# Patient Record
Sex: FEMALE | Race: WHITE | Employment: OTHER | URBAN - METROPOLITAN AREA
[De-identification: names, ages, dates, MRNs, and addresses within clinical notes are randomized per-mention and may not be internally consistent; named-entity substitution may affect disease eponyms.]

---

## 2016-12-29 LAB
BILIRUBIN, URINE: NORMAL
BLOOD, URINE: NORMAL
CHOLESTEROL, TOTAL: 153 MG/DL
CHOLESTEROL/HDL RATIO: 2.3
CLARITY: NORMAL
COLOR: NORMAL
GLUCOSE URINE: NORMAL
HBA1C MFR BLD: 5.6 %
HDLC SERPL-MCNC: 66 MG/DL (ref 35–70)
KETONES, URINE: NORMAL
LDL CHOLESTEROL CALCULATED: 75 MG/DL (ref 0–160)
LEUKOCYTE ESTERASE, URINE: NORMAL
NITRITE, URINE: NORMAL
PH UA: NORMAL (ref 4.5–8)
PROTEIN UA: NORMAL
SPECIFIC GRAVITY, URINE: NORMAL
T4 FREE: 0.89
TRIGL SERPL-MCNC: 58 MG/DL
TSH SERPL DL<=0.05 MIU/L-ACNC: 5.08 UIU/ML
UROBILINOGEN, URINE: NORMAL
VLDLC SERPL CALC-MCNC: 12 MG/DL

## 2017-01-05 DIAGNOSIS — R35.0 FREQUENCY OF URINATION: ICD-10-CM

## 2017-01-05 DIAGNOSIS — E78.00 HIGH CHOLESTEROL: ICD-10-CM

## 2017-01-05 DIAGNOSIS — R79.89 ELEVATED TSH: ICD-10-CM

## 2017-01-05 DIAGNOSIS — Z23 NEED FOR INFLUENZA VACCINATION: ICD-10-CM

## 2017-01-05 DIAGNOSIS — R73.9 HYPERGLYCEMIA: ICD-10-CM

## 2017-01-05 DIAGNOSIS — I10 ESSENTIAL HYPERTENSION: ICD-10-CM

## 2017-04-13 ENCOUNTER — OFFICE VISIT (OUTPATIENT)
Dept: INTERNAL MEDICINE CLINIC | Age: 82
End: 2017-04-13
Payer: COMMERCIAL

## 2017-04-13 VITALS
RESPIRATION RATE: 24 BRPM | SYSTOLIC BLOOD PRESSURE: 140 MMHG | TEMPERATURE: 98.3 F | DIASTOLIC BLOOD PRESSURE: 88 MMHG | BODY MASS INDEX: 35.57 KG/M2 | HEART RATE: 80 BPM | WEIGHT: 188.4 LBS

## 2017-04-13 DIAGNOSIS — R06.02 SHORTNESS OF BREATH ON EXERTION: Primary | ICD-10-CM

## 2017-04-13 DIAGNOSIS — R79.89 ELEVATED TSH: ICD-10-CM

## 2017-04-13 DIAGNOSIS — M25.50 ARTHRALGIA, UNSPECIFIED JOINT: ICD-10-CM

## 2017-04-13 PROCEDURE — 99214 OFFICE O/P EST MOD 30 MIN: CPT | Performed by: INTERNAL MEDICINE

## 2017-04-13 PROCEDURE — 93000 ELECTROCARDIOGRAM COMPLETE: CPT | Performed by: INTERNAL MEDICINE

## 2017-04-13 RX ORDER — LEVOTHYROXINE SODIUM 0.03 MG/1
25 TABLET ORAL DAILY
Qty: 30 TABLET | Refills: 5 | Status: SHIPPED | OUTPATIENT
Start: 2017-04-13 | End: 2017-12-10 | Stop reason: SDUPTHER

## 2017-04-21 LAB
ANA TITER: NEGATIVE
BASOPHILS ABSOLUTE: NORMAL /ΜL
BASOPHILS RELATIVE PERCENT: NORMAL %
EOSINOPHILS ABSOLUTE: NORMAL /ΜL
EOSINOPHILS RELATIVE PERCENT: NORMAL %
HCT VFR BLD CALC: 40.6 % (ref 36–46)
HEMOGLOBIN: 13.6 G/DL (ref 12–16)
LYMPHOCYTES ABSOLUTE: NORMAL /ΜL
LYMPHOCYTES RELATIVE PERCENT: NORMAL %
MCH RBC QN AUTO: NORMAL PG
MCHC RBC AUTO-ENTMCNC: NORMAL G/DL
MCV RBC AUTO: NORMAL FL
MONOCYTES ABSOLUTE: NORMAL /ΜL
MONOCYTES RELATIVE PERCENT: NORMAL %
NEUTROPHILS ABSOLUTE: NORMAL /ΜL
NEUTROPHILS RELATIVE PERCENT: NORMAL %
PLATELET # BLD: 224 K/ΜL
PMV BLD AUTO: NORMAL FL
RBC # BLD: 13.6 10^6/ΜL
RHEUMATOID FACTOR: <10
TOTAL CK: 80 U/L
WBC # BLD: 7.1 10^3/ML

## 2017-05-11 ENCOUNTER — OFFICE VISIT (OUTPATIENT)
Dept: INTERNAL MEDICINE CLINIC | Age: 82
End: 2017-05-11
Payer: COMMERCIAL

## 2017-05-11 VITALS
TEMPERATURE: 98.7 F | HEIGHT: 61 IN | DIASTOLIC BLOOD PRESSURE: 78 MMHG | OXYGEN SATURATION: 96 % | SYSTOLIC BLOOD PRESSURE: 144 MMHG | WEIGHT: 188 LBS | HEART RATE: 75 BPM | BODY MASS INDEX: 35.5 KG/M2 | RESPIRATION RATE: 16 BRPM

## 2017-05-11 DIAGNOSIS — I10 ESSENTIAL HYPERTENSION: Primary | ICD-10-CM

## 2017-05-11 DIAGNOSIS — E03.9 HYPOTHYROIDISM, UNSPECIFIED TYPE: ICD-10-CM

## 2017-05-11 PROCEDURE — 99213 OFFICE O/P EST LOW 20 MIN: CPT | Performed by: INTERNAL MEDICINE

## 2017-05-11 RX ORDER — AMLODIPINE BESYLATE 5 MG/1
TABLET ORAL
Refills: 0 | COMMUNITY
Start: 2017-04-17 | End: 2018-09-25 | Stop reason: CLARIF

## 2017-05-16 DIAGNOSIS — M25.50 ARTHRALGIA, UNSPECIFIED JOINT: ICD-10-CM

## 2017-05-16 DIAGNOSIS — R06.02 SHORTNESS OF BREATH ON EXERTION: ICD-10-CM

## 2017-05-18 DIAGNOSIS — M25.50 ARTHRALGIA, UNSPECIFIED JOINT: ICD-10-CM

## 2017-05-18 DIAGNOSIS — R06.02 SHORTNESS OF BREATH ON EXERTION: ICD-10-CM

## 2017-07-02 DIAGNOSIS — I10 ESSENTIAL HYPERTENSION: ICD-10-CM

## 2017-07-02 DIAGNOSIS — R79.89 ELEVATED TSH: ICD-10-CM

## 2017-07-02 DIAGNOSIS — R31.9 HEMATURIA: ICD-10-CM

## 2017-07-02 DIAGNOSIS — E87.6 HYPOKALEMIA: ICD-10-CM

## 2017-07-03 LAB
T4 FREE: 0.73
TSH SERPL DL<=0.05 MIU/L-ACNC: 4.04 UIU/ML

## 2017-07-03 RX ORDER — LISINOPRIL AND HYDROCHLOROTHIAZIDE 25; 20 MG/1; MG/1
TABLET ORAL
Qty: 90 TABLET | Refills: 1 | Status: SHIPPED | OUTPATIENT
Start: 2017-07-03 | End: 2018-01-03 | Stop reason: SDUPTHER

## 2017-07-06 DIAGNOSIS — I10 ESSENTIAL HYPERTENSION: ICD-10-CM

## 2017-07-06 DIAGNOSIS — E03.9 HYPOTHYROIDISM, UNSPECIFIED TYPE: ICD-10-CM

## 2017-07-13 ENCOUNTER — OFFICE VISIT (OUTPATIENT)
Dept: INTERNAL MEDICINE CLINIC | Age: 82
End: 2017-07-13
Payer: COMMERCIAL

## 2017-07-13 VITALS
DIASTOLIC BLOOD PRESSURE: 80 MMHG | WEIGHT: 186 LBS | HEIGHT: 61 IN | SYSTOLIC BLOOD PRESSURE: 112 MMHG | RESPIRATION RATE: 20 BRPM | HEART RATE: 80 BPM | BODY MASS INDEX: 35.12 KG/M2 | TEMPERATURE: 98.1 F

## 2017-07-13 DIAGNOSIS — I10 ESSENTIAL HYPERTENSION: Primary | ICD-10-CM

## 2017-07-13 DIAGNOSIS — E03.9 HYPOTHYROIDISM, UNSPECIFIED TYPE: ICD-10-CM

## 2017-07-13 DIAGNOSIS — F32.A DEPRESSION, UNSPECIFIED DEPRESSION TYPE: ICD-10-CM

## 2017-07-13 PROCEDURE — 99214 OFFICE O/P EST MOD 30 MIN: CPT | Performed by: INTERNAL MEDICINE

## 2017-07-13 RX ORDER — ESCITALOPRAM OXALATE 10 MG/1
10 TABLET ORAL DAILY
Qty: 30 TABLET | Refills: 2 | Status: SHIPPED | OUTPATIENT
Start: 2017-07-13 | End: 2018-07-17 | Stop reason: SDUPTHER

## 2017-10-18 ENCOUNTER — OFFICE VISIT (OUTPATIENT)
Dept: INTERNAL MEDICINE CLINIC | Age: 82
End: 2017-10-18
Payer: COMMERCIAL

## 2017-10-18 VITALS
HEART RATE: 64 BPM | RESPIRATION RATE: 16 BRPM | WEIGHT: 185 LBS | DIASTOLIC BLOOD PRESSURE: 80 MMHG | SYSTOLIC BLOOD PRESSURE: 122 MMHG | HEIGHT: 61 IN | TEMPERATURE: 98.4 F | BODY MASS INDEX: 34.93 KG/M2

## 2017-10-18 DIAGNOSIS — M76.61 ACHILLES TENDINITIS OF RIGHT LOWER EXTREMITY: Primary | ICD-10-CM

## 2017-10-18 DIAGNOSIS — E03.9 HYPOTHYROIDISM, UNSPECIFIED TYPE: ICD-10-CM

## 2017-10-18 DIAGNOSIS — I10 ESSENTIAL HYPERTENSION: ICD-10-CM

## 2017-10-18 PROCEDURE — 99214 OFFICE O/P EST MOD 30 MIN: CPT | Performed by: INTERNAL MEDICINE

## 2017-10-18 RX ORDER — PREDNISONE 10 MG/1
10 TABLET ORAL
Qty: 15 TABLET | Refills: 0 | Status: SHIPPED | OUTPATIENT
Start: 2017-10-18 | End: 2017-10-23

## 2017-10-18 NOTE — PROGRESS NOTES
Corinne Ham is a 80 y.o. female who presents for   Chief Complaint   Patient presents with    Foot Pain     c/o right heel pain and feels hot at times. started about a week ago. has soaked with water and salt, heat and cold compresses, tried advil prn.    and follow up of chronic medical problems. Patient Active Problem List   Diagnosis    HBP (high blood pressure)     HPI  Here for evaluation of right heel pain started after wearing high heels and went to the shopping and then patient's symptoms started and now feeling pain when she was walking mostly in the right foot and also follow-up on blood pressure and thyroid    Current Outpatient Prescriptions   Medication Sig Dispense Refill    predniSONE (DELTASONE) 10 MG tablet Take 1 tablet by mouth 3 times daily (with meals) for 5 days 15 tablet 0    escitalopram (LEXAPRO) 10 MG tablet Take 1 tablet by mouth daily 30 tablet 2    lisinopril-hydrochlorothiazide (PRINZIDE;ZESTORETIC) 20-25 MG per tablet take 1 tablet by mouth once daily 90 tablet 1    amLODIPine (NORVASC) 5 MG tablet take 1 tablet by mouth once daily  0    levothyroxine (LEVOTHROID) 25 MCG tablet Take 1 tablet by mouth daily 30 tablet 5    mometasone-formoterol (DULERA) 100-5 MCG/ACT inhaler Inhale 2 puffs into the lungs 2 times daily 1 Inhaler 0    Zinc 22.5 MG TABS Take by mouth daily      fluticasone (FLONASE) 50 MCG/ACT nasal spray 2 sprays by Nasal route daily (Patient taking differently: 2 sprays by Nasal route daily as needed ) 1 Bottle 0    BIOTIN by Does not apply route.  CALCIUM PO Take  by mouth daily.  Cholecalciferol (VITAMIN D PO) Take  by mouth daily.  aspirin 81 MG tablet Take 81 mg by mouth daily. No current facility-administered medications for this visit.         Allergies   Allergen Reactions    Codeine        Past Medical History:   Diagnosis Date    HBP (high blood pressure)     Hematuria 4-21-16    Patient had a complete workup in the past distal pulses  Pulmonary:  effort normal and breath sounds normal bilaterally,no wheezes or rales, no respiratory distress  Abdominal:  Soft, non-tender; normal bowel sounds, no masses  Musculoskeletal:  Normal range of motion and no edema or tenderness bilaterally and there is tenderness on the right Achilles tendon and movements were limited because of the pain  No lymphadenopathy  Neurological:  alert, oriented, and normal reflexes bilaterally  Skin: warm and dry  Psychiatric:  normal mood and effect; behavior normal.    Labs:   Lab Results   Component Value Date    LABA1C 5.6 12/29/2016     Lab Results   Component Value Date    CHOL 153 12/29/2016     Lab Results   Component Value Date    HDL 66 12/29/2016     Lab Results   Component Value Date    LDLCALC 75 12/29/2016     Lab Results   Component Value Date    TRIG 58 12/29/2016     No components found for: [place], [place]  Lab Results   Component Value Date    WBC 7.1 04/21/2017    HGB 13.6 04/21/2017    HCT 40.6 04/21/2017     04/21/2017     No results found for: INR, PROTIME  Lab Results   Component Value Date    GLUCOSE 107 01/22/2014    CREATININE 0.95 01/22/2014    BUN 17 01/22/2014     01/22/2014    K 3.6 01/22/2014     01/22/2014    CO2 27 01/22/2014     Lab Results   Component Value Date    ALT 16 01/22/2014    AST 23 01/22/2014    ALKPHOS 72 01/22/2014    BILITOT 1.0 01/22/2014     Lab Results   Component Value Date    LABALBU 3.7 01/22/2014     Lab Results   Component Value Date    TSH 4.04 07/03/2017     Assessment:  1. Achilles tendinitis of right lower extremity     2. Essential hypertension     3.  Hypothyroidism, unspecified type           Plan:  Advised patient to use prednisone 10 mg 3 times a day for 5 days apply warm compresses and not to use high heels  Patient also advised to see the practice to and patient wants to wait at this time and will call me back in the next week if no improvement will refer to podiatry  Patient's blood pressure stable on current medications  Continue Synthroid and last TSH was 4.04  Review in 4 months           1. Shasta received counseling on the following healthy behaviors: nutrition and exercise    2. Prior labs and health maintenance reviewed. 3.  Discussed use, benefit, and side effects of prescribed medications. Barriers to medication compliance addressed. All her questions were answered. Pt voiced understanding. Chaz Jones will continue current medications, diet and exercise. Orders Placed This Encounter   Medications    predniSONE (DELTASONE) 10 MG tablet     Sig: Take 1 tablet by mouth 3 times daily (with meals) for 5 days     Dispense:  15 tablet     Refill:  0          Completed Refills               Requested Prescriptions     Signed Prescriptions Disp Refills    predniSONE (DELTASONE) 10 MG tablet 15 tablet 0     Sig: Take 1 tablet by mouth 3 times daily (with meals) for 5 days     4. Patient given educational materials - see patient instructions    5. Was a self-tracking handout given in paper form or via [employer]? NO    No orders of the defined types were placed in this encounter. No Follow-up on file. Patient voiced understanding and agreed to treatment plan. Electronically signed by Wilver James MD on 10/18/2017 at 1:34 PM    This note is created with a voice recognition program and while intend to generate a document that accurately reflects the content of the visit, no guarantee can be provided that every mistake has been identified and corrected by editing.

## 2017-10-18 NOTE — PROGRESS NOTES
Chronic Disease Visit Information    BP Readings from Last 3 Encounters:   07/13/17 112/80   05/11/17 (!) 144/78   04/13/17 (!) 140/88          Hemoglobin A1C (%)   Date Value   12/29/2016 5.6   04/08/2016 6.0   11/26/2014 5.9     LDL Calculated (mg/dL)   Date Value   12/29/2016 75     HDL (mg/dL)   Date Value   12/29/2016 66     BUN (mg/dL)   Date Value   01/22/2014 17     CREATININE (no units)   Date Value   01/22/2014 0.95     Glucose (mg/dL)   Date Value   01/22/2014 107            Have you changed or started any medications since your last visit including any over-the-counter medicines, vitamins, or herbal medicines? no   Are you having any side effects from any of your medications? -  no  Have you stopped taking any of your medications? Is so, why? -  no    Have you seen any other physician or provider since your last visit? No  Have you had any other diagnostic tests since your last visit? No  Have you been seen in the emergency room and/or had an admission to a hospital since we last saw you? No  Have you had your annual diabetic retinal (eye) exam? No  Have you had your routine dental cleaning in the past 6 months? no    Have you activated your [employer] account? If not, what are your barriers?  Yes     Patient Care Team:  Naomi Avina MD as PCP - General (Internal Medicine)         Medical History Review  Past Medical, Family, and Social History reviewed and does contribute to the patient presenting condition    Health Maintenance   Topic Date Due    Flu vaccine (1) 09/01/2017    Zostavax vaccine  12/12/2017 (Originally 2/10/1995)    DTaP/Tdap/Td vaccine (1 - Tdap) 05/15/2018 (Originally 2/10/1954)    DEXA (modify frequency per FRAX score)  Addressed    Pneumococcal low/med risk  Completed

## 2017-11-15 ENCOUNTER — OFFICE VISIT (OUTPATIENT)
Dept: INTERNAL MEDICINE CLINIC | Age: 82
End: 2017-11-15
Payer: COMMERCIAL

## 2017-11-15 VITALS
RESPIRATION RATE: 16 BRPM | WEIGHT: 189 LBS | HEIGHT: 61 IN | SYSTOLIC BLOOD PRESSURE: 156 MMHG | HEART RATE: 67 BPM | BODY MASS INDEX: 35.68 KG/M2 | OXYGEN SATURATION: 97 % | DIASTOLIC BLOOD PRESSURE: 90 MMHG

## 2017-11-15 DIAGNOSIS — R73.9 HYPERGLYCEMIA: ICD-10-CM

## 2017-11-15 DIAGNOSIS — E03.9 HYPOTHYROIDISM, UNSPECIFIED TYPE: ICD-10-CM

## 2017-11-15 DIAGNOSIS — E78.00 HIGH CHOLESTEROL: ICD-10-CM

## 2017-11-15 DIAGNOSIS — I10 ESSENTIAL HYPERTENSION: Primary | ICD-10-CM

## 2017-11-15 PROBLEM — E66.8 CONSTITUTIONAL OBESITY: Status: ACTIVE | Noted: 2017-10-23

## 2017-11-15 PROBLEM — R06.02 SHORTNESS OF BREATH: Status: ACTIVE | Noted: 2017-10-23

## 2017-11-15 PROCEDURE — G0008 ADMIN INFLUENZA VIRUS VAC: HCPCS | Performed by: INTERNAL MEDICINE

## 2017-11-15 PROCEDURE — 99214 OFFICE O/P EST MOD 30 MIN: CPT | Performed by: INTERNAL MEDICINE

## 2017-11-15 PROCEDURE — 90688 IIV4 VACCINE SPLT 0.5 ML IM: CPT | Performed by: INTERNAL MEDICINE

## 2017-11-15 NOTE — PROGRESS NOTES
Katelyn Vázquez is a 80 y.o. female who presents for   Chief Complaint   Patient presents with    Hypertension     4 month check up; patient states that sometimes she forgets to take her BP medication but she made sure she took today. patient was unable to review medication list \"states doesnt sound fimilar\"    Headache     Patient thinks it has to do with a weather     Health Maintenance     patient due for influenza vacc    and follow up of chronic medical problems. Patient Active Problem List   Diagnosis    HBP (high blood pressure)    Constitutional obesity    Hypothyroidism    Shortness of breath     HPI  Here for follow-up on blood pressure denies any new complaints    Current Outpatient Prescriptions   Medication Sig Dispense Refill    escitalopram (LEXAPRO) 10 MG tablet Take 1 tablet by mouth daily 30 tablet 2    lisinopril-hydrochlorothiazide (PRINZIDE;ZESTORETIC) 20-25 MG per tablet take 1 tablet by mouth once daily 90 tablet 1    levothyroxine (LEVOTHROID) 25 MCG tablet Take 1 tablet by mouth daily 30 tablet 5    Zinc 22.5 MG TABS Take by mouth daily      fluticasone (FLONASE) 50 MCG/ACT nasal spray 2 sprays by Nasal route daily (Patient taking differently: 2 sprays by Nasal route daily as needed ) 1 Bottle 0    BIOTIN by Does not apply route.  CALCIUM PO Take  by mouth daily.  Cholecalciferol (VITAMIN D PO) Take  by mouth daily.  aspirin 81 MG tablet Take 81 mg by mouth daily.  amLODIPine (NORVASC) 5 MG tablet take 1 tablet by mouth once daily  0     No current facility-administered medications for this visit. Allergies   Allergen Reactions    Codeine        Past Medical History:   Diagnosis Date    HBP (high blood pressure)     Hematuria 4-21-16    Patient had a complete workup in the past    Hyperglycemia        No past surgical history on file.     Family History   Problem Relation Age of Onset    High Blood Pressure Mother     High Blood Pressure

## 2017-12-11 RX ORDER — LEVOTHYROXINE SODIUM 0.03 MG/1
TABLET ORAL
Qty: 30 TABLET | Refills: 3 | Status: SHIPPED | OUTPATIENT
Start: 2017-12-11 | End: 2018-06-20 | Stop reason: SDUPTHER

## 2018-01-03 DIAGNOSIS — I10 ESSENTIAL HYPERTENSION: ICD-10-CM

## 2018-01-03 DIAGNOSIS — E87.6 HYPOKALEMIA: ICD-10-CM

## 2018-01-03 DIAGNOSIS — R31.9 HEMATURIA: ICD-10-CM

## 2018-01-03 DIAGNOSIS — R79.89 ELEVATED TSH: ICD-10-CM

## 2018-01-04 RX ORDER — LISINOPRIL AND HYDROCHLOROTHIAZIDE 25; 20 MG/1; MG/1
TABLET ORAL
Qty: 90 TABLET | Refills: 1 | Status: SHIPPED | OUTPATIENT
Start: 2018-01-04 | End: 2019-03-27 | Stop reason: SDUPTHER

## 2018-03-06 RX ORDER — OXYBUTYNIN CHLORIDE 10 MG/1
TABLET, EXTENDED RELEASE ORAL
Qty: 30 TABLET | Refills: 1 | Status: SHIPPED | OUTPATIENT
Start: 2018-03-06 | End: 2018-04-30 | Stop reason: SDUPTHER

## 2018-03-16 ENCOUNTER — OFFICE VISIT (OUTPATIENT)
Dept: INTERNAL MEDICINE CLINIC | Age: 83
End: 2018-03-16
Payer: COMMERCIAL

## 2018-03-16 VITALS
WEIGHT: 187.6 LBS | BODY MASS INDEX: 35.42 KG/M2 | OXYGEN SATURATION: 97 % | TEMPERATURE: 98.6 F | HEART RATE: 76 BPM | RESPIRATION RATE: 17 BRPM | DIASTOLIC BLOOD PRESSURE: 78 MMHG | SYSTOLIC BLOOD PRESSURE: 136 MMHG | HEIGHT: 61 IN

## 2018-03-16 DIAGNOSIS — R42 VERTIGO: ICD-10-CM

## 2018-03-16 DIAGNOSIS — E03.9 HYPOTHYROIDISM, UNSPECIFIED TYPE: ICD-10-CM

## 2018-03-16 DIAGNOSIS — E87.6 HYPOKALEMIA: ICD-10-CM

## 2018-03-16 DIAGNOSIS — I10 ESSENTIAL HYPERTENSION: Primary | ICD-10-CM

## 2018-03-16 PROCEDURE — 99214 OFFICE O/P EST MOD 30 MIN: CPT | Performed by: INTERNAL MEDICINE

## 2018-03-16 RX ORDER — MECLIZINE HCL 12.5 MG/1
12.5 TABLET ORAL 3 TIMES DAILY PRN
Qty: 60 TABLET | Refills: 0 | Status: SHIPPED | OUTPATIENT
Start: 2018-03-16 | End: 2018-03-26

## 2018-03-16 RX ORDER — MV-MN/OM3/DHA/EPA/FISH/LUT/ZEA 250-5-1 MG
1 CAPSULE ORAL DAILY
Qty: 30 CAPSULE | Refills: 1 | Status: SHIPPED | OUTPATIENT
Start: 2018-03-16 | End: 2018-07-17 | Stop reason: SDUPTHER

## 2018-03-16 ASSESSMENT — PATIENT HEALTH QUESTIONNAIRE - PHQ9
1. LITTLE INTEREST OR PLEASURE IN DOING THINGS: 1
SUM OF ALL RESPONSES TO PHQ9 QUESTIONS 1 & 2: 1
2. FEELING DOWN, DEPRESSED OR HOPELESS: 0
SUM OF ALL RESPONSES TO PHQ QUESTIONS 1-9: 1

## 2018-03-16 NOTE — PROGRESS NOTES
thyromegaly  Cardiovascular:  RRR, normal heart sounds and intact distal pulses  Pulmonary:  effort normal and breath sounds normal bilaterally,no wheezes or rales, no respiratory distress  Abdominal:  Soft, non-tender; normal bowel sounds, no masses  Musculoskeletal:  Normal range of motion and no edema or tenderness bilaterally  No lymphadenopathy  Neurological:  alert, oriented, and normal reflexes bilaterally  Skin: warm and dry  Psychiatric:  normal mood and effect; behavior normal.    Labs:   Lab Results   Component Value Date    LABA1C 5.6 12/29/2016     Lab Results   Component Value Date    CHOL 153 12/29/2016     Lab Results   Component Value Date    HDL 66 12/29/2016     Lab Results   Component Value Date    LDLCALC 75 12/29/2016     Lab Results   Component Value Date    TRIG 58 12/29/2016     No components found for: [place], [place]  Lab Results   Component Value Date    WBC 7.1 04/21/2017    HGB 13.6 04/21/2017    HCT 40.6 04/21/2017     04/21/2017     No results found for: INR, PROTIME  Lab Results   Component Value Date    GLUCOSE 107 01/22/2014    CREATININE 0.95 01/22/2014    BUN 17 01/22/2014     01/22/2014    K 3.6 01/22/2014     01/22/2014    CO2 27 01/22/2014     Lab Results   Component Value Date    ALT 16 01/22/2014    AST 23 01/22/2014    ALKPHOS 72 01/22/2014    BILITOT 1.0 01/22/2014     Lab Results   Component Value Date    LABALBU 3.7 01/22/2014     Lab Results   Component Value Date    TSH 4.04 07/03/2017     Assessment:  1. Essential hypertension    2. Hypokalemia    3. Hypothyroidism, unspecified type    4.  Vertigo        Plan:  Patient's blood pressure stable on current medications and patient's potassium was slightly low at 3.4 and we did discuss about medications but patient does not want to take any potassium supplements at this time and so advised patient to decrease the lisinopril hydrochlorothiazide tablet to half a day as her blood pressure is stable and advised to

## 2018-04-04 DIAGNOSIS — R73.9 HYPERGLYCEMIA: ICD-10-CM

## 2018-04-04 DIAGNOSIS — I10 ESSENTIAL HYPERTENSION: ICD-10-CM

## 2018-04-04 DIAGNOSIS — E03.9 HYPOTHYROIDISM, UNSPECIFIED TYPE: ICD-10-CM

## 2018-04-04 DIAGNOSIS — E78.00 HIGH CHOLESTEROL: ICD-10-CM

## 2018-04-04 LAB
ALBUMIN SERPL-MCNC: NORMAL G/DL
ALP BLD-CCNC: NORMAL U/L
ALT SERPL-CCNC: NORMAL U/L
ANION GAP SERPL CALCULATED.3IONS-SCNC: NORMAL MMOL/L
AST SERPL-CCNC: NORMAL U/L
AVERAGE GLUCOSE: 114
BASOPHILS ABSOLUTE: NORMAL /ΜL
BASOPHILS RELATIVE PERCENT: NORMAL %
BILIRUB SERPL-MCNC: NORMAL MG/DL (ref 0.1–1.4)
BUN BLDV-MCNC: NORMAL MG/DL
CALCIUM SERPL-MCNC: NORMAL MG/DL
CHLORIDE BLD-SCNC: NORMAL MMOL/L
CHOLESTEROL, TOTAL: 140 MG/DL
CHOLESTEROL/HDL RATIO: 2.3
CO2: NORMAL MMOL/L
CREAT SERPL-MCNC: NORMAL MG/DL
EOSINOPHILS ABSOLUTE: NORMAL /ΜL
EOSINOPHILS RELATIVE PERCENT: NORMAL %
GFR CALCULATED: NORMAL
GLUCOSE BLD-MCNC: NORMAL MG/DL
HBA1C MFR BLD: 5.6 %
HCT VFR BLD CALC: NORMAL % (ref 36–46)
HDLC SERPL-MCNC: 61 MG/DL (ref 35–70)
HEMOGLOBIN: NORMAL G/DL (ref 12–16)
LDL CHOLESTEROL CALCULATED: 64 MG/DL (ref 0–160)
LYMPHOCYTES ABSOLUTE: NORMAL /ΜL
LYMPHOCYTES RELATIVE PERCENT: NORMAL %
MCH RBC QN AUTO: NORMAL PG
MCHC RBC AUTO-ENTMCNC: NORMAL G/DL
MCV RBC AUTO: NORMAL FL
MONOCYTES ABSOLUTE: NORMAL /ΜL
MONOCYTES RELATIVE PERCENT: NORMAL %
NEUTROPHILS ABSOLUTE: NORMAL /ΜL
NEUTROPHILS RELATIVE PERCENT: NORMAL %
PLATELET # BLD: NORMAL K/ΜL
PMV BLD AUTO: NORMAL FL
POTASSIUM SERPL-SCNC: NORMAL MMOL/L
RBC # BLD: NORMAL 10^6/ΜL
SODIUM BLD-SCNC: NORMAL MMOL/L
T4 FREE: NORMAL
TOTAL PROTEIN: NORMAL
TRIGL SERPL-MCNC: 73 MG/DL
TSH SERPL DL<=0.05 MIU/L-ACNC: NORMAL UIU/ML
VLDLC SERPL CALC-MCNC: 15 MG/DL
WBC # BLD: NORMAL 10^3/ML

## 2018-04-25 LAB
BUN BLDV-MCNC: 16 MG/DL
CALCIUM SERPL-MCNC: 9.4 MG/DL
CHLORIDE BLD-SCNC: 107 MMOL/L
CO2: 28 MMOL/L
CREAT SERPL-MCNC: 0.94 MG/DL
GFR CALCULATED: NORMAL
GLUCOSE BLD-MCNC: 107 MG/DL
POTASSIUM SERPL-SCNC: 3.9 MMOL/L
SODIUM BLD-SCNC: 143 MMOL/L

## 2018-04-27 DIAGNOSIS — I10 ESSENTIAL HYPERTENSION: ICD-10-CM

## 2018-04-27 DIAGNOSIS — E03.9 HYPOTHYROIDISM, UNSPECIFIED TYPE: ICD-10-CM

## 2018-04-27 DIAGNOSIS — R42 VERTIGO: ICD-10-CM

## 2018-04-27 DIAGNOSIS — E87.6 HYPOKALEMIA: ICD-10-CM

## 2018-04-30 RX ORDER — OXYBUTYNIN CHLORIDE 10 MG/1
TABLET, EXTENDED RELEASE ORAL
Qty: 30 TABLET | Refills: 5 | Status: SHIPPED | OUTPATIENT
Start: 2018-04-30 | End: 2018-11-19 | Stop reason: CLARIF

## 2018-06-21 RX ORDER — LEVOTHYROXINE SODIUM 0.03 MG/1
TABLET ORAL
Qty: 30 TABLET | Refills: 3 | Status: SHIPPED | OUTPATIENT
Start: 2018-06-21 | End: 2018-07-17 | Stop reason: CLARIF

## 2018-07-17 ENCOUNTER — OFFICE VISIT (OUTPATIENT)
Dept: INTERNAL MEDICINE CLINIC | Age: 83
End: 2018-07-17
Payer: COMMERCIAL

## 2018-07-17 VITALS
TEMPERATURE: 97.9 F | HEIGHT: 65 IN | WEIGHT: 187.8 LBS | SYSTOLIC BLOOD PRESSURE: 144 MMHG | HEART RATE: 64 BPM | RESPIRATION RATE: 16 BRPM | BODY MASS INDEX: 31.29 KG/M2 | DIASTOLIC BLOOD PRESSURE: 82 MMHG

## 2018-07-17 DIAGNOSIS — F32.A DEPRESSION, UNSPECIFIED DEPRESSION TYPE: ICD-10-CM

## 2018-07-17 DIAGNOSIS — I10 ESSENTIAL HYPERTENSION: Primary | ICD-10-CM

## 2018-07-17 DIAGNOSIS — E03.9 HYPOTHYROIDISM, UNSPECIFIED TYPE: ICD-10-CM

## 2018-07-17 DIAGNOSIS — F43.0 ACUTE STRESS DISORDER: ICD-10-CM

## 2018-07-17 PROCEDURE — 99214 OFFICE O/P EST MOD 30 MIN: CPT | Performed by: INTERNAL MEDICINE

## 2018-07-17 RX ORDER — LEVOTHYROXINE SODIUM 0.05 MG/1
50 TABLET ORAL DAILY
Qty: 30 TABLET | Refills: 5 | Status: SHIPPED | OUTPATIENT
Start: 2018-07-17 | End: 2018-10-08 | Stop reason: SDUPTHER

## 2018-07-17 RX ORDER — LEVOTHYROXINE SODIUM 0.05 MG/1
50 TABLET ORAL DAILY
COMMUNITY
End: 2018-07-17 | Stop reason: SDUPTHER

## 2018-07-17 RX ORDER — ESCITALOPRAM OXALATE 10 MG/1
10 TABLET ORAL DAILY
Qty: 30 TABLET | Refills: 5 | Status: SHIPPED | OUTPATIENT
Start: 2018-07-17 | End: 2019-12-17 | Stop reason: SDUPTHER

## 2018-07-17 RX ORDER — ALPRAZOLAM 0.25 MG/1
0.25 TABLET ORAL 3 TIMES DAILY PRN
Qty: 10 TABLET | Refills: 0 | Status: SHIPPED | OUTPATIENT
Start: 2018-07-17 | End: 2018-08-16

## 2018-07-17 RX ORDER — MV-MN/OM3/DHA/EPA/FISH/LUT/ZEA 250-5-1 MG
1 CAPSULE ORAL DAILY
Qty: 30 CAPSULE | Refills: 5 | Status: SHIPPED | OUTPATIENT
Start: 2018-07-17 | End: 2018-11-19 | Stop reason: CLARIF

## 2018-07-17 NOTE — PROGRESS NOTES
Visit Information    Have you changed or started any medications since your last visit including any over-the-counter medicines, vitamins, or herbal medicines? no   Are you having any side effects from any of your medications? -  no  Have you stopped taking any of your medications? Is so, why? -  no    Have you seen any other physician or provider since your last visit? No  Have you had any other diagnostic tests since your last visit? Yes - Records Obtained  Have you been seen in the emergency room and/or had an admission to a hospital since we last saw you? No  Have you had your routine dental cleaning in the past 6 months? yes -     Have you activated your [employer] account? If not, what are your barriers?  Yes     Patient Care Team:  Anisha Montez MD as PCP - General (Internal Medicine)    Medical History Review  Past Medical, Family, and Social History reviewed and does contribute to the patient presenting condition    Health Maintenance   Topic Date Due    DTaP/Tdap/Td vaccine (1 - Tdap) [birth year]1954    Shingles Vaccine (1 of 2 - 2 Dose Series) 03/14/2019 (Originally 2/10/1985)    Flu vaccine (1) 09/01/2018    TSH testing  03/08/2019    Potassium monitoring  04/25/2019    Creatinine monitoring  04/25/2019    DEXA (modify frequency per FRAX score)  Addressed    Pneumococcal low/med risk  Completed
Xanax for acute anxiety and acute stress and take it only as needed and I informed the patient dates only a short-term measure and patient verbalized understanding  Review in 4 months           1. Shasta received counseling on the following healthy behaviors: nutrition and exercise    2. Prior labs and health maintenance reviewed. 3.  Discussed use, benefit, and side effects of prescribed medications. Barriers to medication compliance addressed. All her questions were answered. Pt voiced understanding. Joan Isabel will continue current medications, diet and exercise. Orders Placed This Encounter   Medications    escitalopram (LEXAPRO) 10 MG tablet     Sig: Take 1 tablet by mouth daily     Dispense:  30 tablet     Refill:  5    levothyroxine (SYNTHROID) 50 MCG tablet     Sig: Take 1 tablet by mouth Daily     Dispense:  30 tablet     Refill:  5    Multiple Vitamins-Minerals (OCUVITE ADULT 50+) CAPS     Sig: Take 1 capsule by mouth daily     Dispense:  30 capsule     Refill:  5    ALPRAZolam (XANAX) 0.25 MG tablet     Sig: Take 1 tablet by mouth 3 times daily as needed for Anxiety for up to 30 days. .     Dispense:  10 tablet     Refill:  0          Completed Refills               Requested Prescriptions     Signed Prescriptions Disp Refills    escitalopram (LEXAPRO) 10 MG tablet 30 tablet 5     Sig: Take 1 tablet by mouth daily    levothyroxine (SYNTHROID) 50 MCG tablet 30 tablet 5     Sig: Take 1 tablet by mouth Daily    Multiple Vitamins-Minerals (OCUVITE ADULT 50+) CAPS 30 capsule 5     Sig: Take 1 capsule by mouth daily    ALPRAZolam (XANAX) 0.25 MG tablet 10 tablet 0     Sig: Take 1 tablet by mouth 3 times daily as needed for Anxiety for up to 30 days. .     4. Patient given educational materials - see patient instructions    5. Was a self-tracking handout given in paper form or via [employer]? NO    No orders of the defined types were placed in this encounter.     Return in about 4 months

## 2018-09-25 ENCOUNTER — OFFICE VISIT (OUTPATIENT)
Dept: INTERNAL MEDICINE CLINIC | Age: 83
End: 2018-09-25
Payer: COMMERCIAL

## 2018-09-25 VITALS
BODY MASS INDEX: 30.62 KG/M2 | RESPIRATION RATE: 16 BRPM | SYSTOLIC BLOOD PRESSURE: 140 MMHG | TEMPERATURE: 98.3 F | DIASTOLIC BLOOD PRESSURE: 88 MMHG | WEIGHT: 184 LBS

## 2018-09-25 DIAGNOSIS — E03.9 HYPOTHYROIDISM, UNSPECIFIED TYPE: ICD-10-CM

## 2018-09-25 DIAGNOSIS — R44.3 HALLUCINATIONS: Primary | ICD-10-CM

## 2018-09-25 DIAGNOSIS — R51.9 FREQUENT HEADACHES: ICD-10-CM

## 2018-09-25 DIAGNOSIS — I49.3 PVC (PREMATURE VENTRICULAR CONTRACTION): ICD-10-CM

## 2018-09-25 PROCEDURE — 81003 URINALYSIS AUTO W/O SCOPE: CPT | Performed by: INTERNAL MEDICINE

## 2018-09-25 PROCEDURE — 93000 ELECTROCARDIOGRAM COMPLETE: CPT | Performed by: INTERNAL MEDICINE

## 2018-09-25 PROCEDURE — 99214 OFFICE O/P EST MOD 30 MIN: CPT | Performed by: INTERNAL MEDICINE

## 2018-09-25 NOTE — PROGRESS NOTES
Kandis Montenegro is a 80 y.o. female who presents for   Chief Complaint   Patient presents with    Dizziness     has felt like her head is in a dream    Hallucinations     sees people, trees children for 2 days    Other     grandson notices her thoughts dont seem clear for past week    and follow up of chronic medical problems. Patient Active Problem List   Diagnosis    HBP (high blood pressure)    Constitutional obesity    Hypothyroidism    Shortness of breath     HPI  Here for evaluation of for seeing things when she is sitting at home watching TV and having for the last few weeks denies any fever or chills no urgency frequency or burning of urination no other symptoms and patient has no focal deficit and happens only when it is dark and patient is watching TV    Current Outpatient Prescriptions   Medication Sig Dispense Refill    escitalopram (LEXAPRO) 10 MG tablet Take 1 tablet by mouth daily 30 tablet 5    levothyroxine (SYNTHROID) 50 MCG tablet Take 1 tablet by mouth Daily 30 tablet 5    Multiple Vitamins-Minerals (OCUVITE ADULT 50+) CAPS Take 1 capsule by mouth daily 30 capsule 5    oxybutynin (DITROPAN-XL) 10 MG extended release tablet take 1 tablet by mouth once daily 30 tablet 5    lisinopril-hydrochlorothiazide (PRINZIDE;ZESTORETIC) 20-25 MG per tablet take 1 tablet by mouth once daily (Patient taking differently: take half tablet by mouth once daily) 90 tablet 1    Zinc 22.5 MG TABS Take by mouth daily      CALCIUM PO Take  by mouth daily.  Cholecalciferol (VITAMIN D PO) Take  by mouth daily.  aspirin 81 MG tablet Take 81 mg by mouth daily.  BIOTIN by Does not apply route. No current facility-administered medications for this visit.         Allergies   Allergen Reactions    Codeine        Past Medical History:   Diagnosis Date    HBP (high blood pressure)     Hematuria 4-21-16    Patient had a complete workup in the past    Hyperglycemia        No past surgical history on file.     Family History   Problem Relation Age of Onset    High Blood Pressure Mother     High Blood Pressure Father     Asthma Brother     Cancer Maternal Uncle         colon     ROS   Constitutional:  Negative for fatigue, loss of appetite and unexpected weight change   HEENT            : Negative for neck stiffness and pain, no congestion or sinus pressure   Eyes                : No visual disturbance or pain   Cardiovascular: No chest pain or palpitations or leg swelling   Respiratory      : Negative for cough, shortness of breath or wheezing   Gastrointestinal: Negative for abdominal pain, constipation or diarrhea and bloating No nausea or vomiting   Genitourinary:     No urgency or frequency, no burning or hematuria   Musculoskeletal: No arthralgias, back pain or myalgias   Skin                  : Negative for rash or erythema   Neurological    : Negative for dizziness, weakness, tremors ,light headedness or syncope   Psychiatric       : Negative for dysphoric mood, sleep disturbances, nervous or anxious, or decreased concentration   All other review of systems was negative    Objective  Physical Examination:    Nursing note reviewed    BP (!) 140/88 (Site: Left Upper Arm)   Temp 98.3 °F (36.8 °C) (Oral)   Resp 16   Wt 184 lb (83.5 kg)   BMI 30.62 kg/m²   BP Readings from Last 3 Encounters:   09/25/18 (!) 140/88   07/17/18 (!) 144/82   03/16/18 136/78         Constitutional:  Gayathri Harrington is oriented to place, person and time ,appears well-developed and well-nourished  HEENT:  Atraumatic and normocephalic, external ears normal bilaterally, nose normal no oropharyngeal exudate and is clear and moist  Eyes:  EOCM normal; conjunctivae normal; PERRLA bilaterally  Neck:  Normal range of motion, neck supple, no JVD and no thyromegaly  Cardiovascular:  RRR, normal heart sounds and intact distal pulses  Pulmonary:  effort normal and breath sounds normal bilaterally,no wheezes or rales, no respiratory distress  Abdominal:  Soft, non-tender; normal bowel sounds, no masses  Musculoskeletal:  Normal range of motion and no edema or tenderness bilaterally  No lymphadenopathy  Neurological:  alert, oriented, and normal reflexes bilaterally  Skin: warm and dry  Psychiatric:  normal mood and effect; behavior normal.    Labs:   Lab Results   Component Value Date    LABA1C 5.6 03/08/2018     Lab Results   Component Value Date    CHOL 140 03/08/2018     Lab Results   Component Value Date    HDL 61 03/08/2018     Lab Results   Component Value Date    LDLCALC 64 03/08/2018     Lab Results   Component Value Date    TRIG 73 03/08/2018     No components found for: [place], [place]  Lab Results   Component Value Date    WBC 7.1 04/21/2017    HGB 13.6 04/21/2017    HCT 40.6 04/21/2017     04/21/2017     No results found for: INR, PROTIME  Lab Results   Component Value Date    GLUCOSE 107 04/25/2018    CREATININE 0.94 04/25/2018    BUN 16 04/25/2018     04/25/2018    K 3.9 04/25/2018     04/25/2018    CO2 28 04/25/2018     Lab Results   Component Value Date    ALT 16 01/22/2014    AST 23 01/22/2014    ALKPHOS 72 01/22/2014    BILITOT 1.0 01/22/2014     Lab Results   Component Value Date    LABALBU 3.7 01/22/2014     Lab Results   Component Value Date    TSH 4.04 07/03/2017     Assessment:  1. Hallucinations    2. PVC (premature ventricular contraction)    3.  Hypothyroidism, unspecified type        Plan:  CT scan of the head ordered stat to evaluate for any occult stroke and patient advised to go to the hospital in case if any symptoms get worse and patient's caregiver also informed and both verbalized understanding  Initial EKG showed a premature ventricle contractions which were resolved and the second EKG and patient has normal sinus rhythm and patient was seeing cardiology in the past  Labs ordered to check for UTI and also CBC and CMP ordered along with a TSH  Patient strongly counseled to go to the

## 2018-09-26 NOTE — PROGRESS NOTES
Appointment was made Stat yesterday at 4 PM for pt at [facility]. I called and spoke to pt and she said she was tired and asked if she could go today. I called Margie, spoke to scheduling and re-scheduled for 1PM today. I gave pt address and told her to be at her appointment at 12:45. Pt verbalized clear understanding. l

## 2018-09-27 ENCOUNTER — TELEPHONE (OUTPATIENT)
Dept: INTERNAL MEDICINE CLINIC | Age: 83
End: 2018-09-27

## 2018-09-27 NOTE — TELEPHONE ENCOUNTER
Carol Peres   Can we get the hospital records  Please send a note to the court stating that patient had a acute subarachnoid hemorrhage and is in the hospital unable to attend the court

## 2018-09-27 NOTE — TELEPHONE ENCOUNTER
Pt is calling and currently in the hospital but has a court date for tomorrow- she is asking for a letter to be faxed to the courthouse stating that she will be unable to make it due to the fact she is admitted     [facility] room 12 at 9am     Please fax to [phone]

## 2018-09-27 NOTE — LETTER
[facility]  [address]  [address]  [facility]  Phone: [phone]  Fax: [phone]    Blake Cevallos MD        September 27, 2018     Patient: Laura Shannon   YOB: 1935   Date of Visit: 9/27/2018       To Whom it May Concern:    Laura Shannon was seen in my clinic on 9/25/18 and the patient has abnormal CT scan and hallucinations for which she was admitted in the hospital at [facility] and will be unable to attend court duties tomorrow as scheduled    If you have any questions or concerns, please don't hesitate to call.     Sincerely,         Blake Cevallos MD

## 2018-09-28 DIAGNOSIS — R51.9 FREQUENT HEADACHES: ICD-10-CM

## 2018-09-28 DIAGNOSIS — R44.3 HALLUCINATIONS: ICD-10-CM

## 2018-10-04 ENCOUNTER — OFFICE VISIT (OUTPATIENT)
Dept: INTERNAL MEDICINE CLINIC | Age: 83
End: 2018-10-04
Payer: COMMERCIAL

## 2018-10-04 VITALS
BODY MASS INDEX: 29.66 KG/M2 | SYSTOLIC BLOOD PRESSURE: 134 MMHG | RESPIRATION RATE: 17 BRPM | HEART RATE: 59 BPM | DIASTOLIC BLOOD PRESSURE: 78 MMHG | TEMPERATURE: 98.1 F | OXYGEN SATURATION: 97 % | HEIGHT: 65 IN | WEIGHT: 178 LBS

## 2018-10-04 DIAGNOSIS — R00.0 TACHYCARDIA: ICD-10-CM

## 2018-10-04 DIAGNOSIS — I62.9 INTRACRANIAL BLEED (HCC): ICD-10-CM

## 2018-10-04 DIAGNOSIS — I10 ESSENTIAL HYPERTENSION: Primary | ICD-10-CM

## 2018-10-04 DIAGNOSIS — I49.3 PVC (PREMATURE VENTRICULAR CONTRACTION): ICD-10-CM

## 2018-10-04 DIAGNOSIS — R44.3 HALLUCINATIONS: ICD-10-CM

## 2018-10-04 PROCEDURE — 99215 OFFICE O/P EST HI 40 MIN: CPT | Performed by: INTERNAL MEDICINE

## 2018-10-04 RX ORDER — AMLODIPINE BESYLATE 5 MG/1
5 TABLET ORAL
COMMUNITY
End: 2019-05-16 | Stop reason: SDUPTHER

## 2018-10-04 RX ORDER — ALPRAZOLAM 0.25 MG/1
0.25 TABLET ORAL
COMMUNITY
End: 2018-11-19 | Stop reason: CLARIF

## 2018-10-04 RX ORDER — PANTOPRAZOLE SODIUM 40 MG/1
40 TABLET, DELAYED RELEASE ORAL
COMMUNITY
Start: 2018-10-01 | End: 2018-11-01 | Stop reason: SDUPTHER

## 2018-10-04 RX ORDER — FLUTICASONE PROPIONATE 50 MCG
1 SPRAY, SUSPENSION (ML) NASAL
COMMUNITY
End: 2018-11-19 | Stop reason: CLARIF

## 2018-10-04 RX ORDER — METOPROLOL TARTRATE 50 MG/1
50 TABLET, FILM COATED ORAL
COMMUNITY
Start: 2018-09-30 | End: 2019-05-16 | Stop reason: SDUPTHER

## 2018-10-04 RX ORDER — MECLIZINE HCL 12.5 MG/1
12.5 TABLET ORAL
COMMUNITY
End: 2020-03-05 | Stop reason: ALTCHOICE

## 2018-10-04 RX ORDER — CHOLECALCIFEROL (VITAMIN D3) 25 MCG
1 CAPSULE ORAL
COMMUNITY
End: 2021-08-23

## 2018-10-04 NOTE — PROGRESS NOTES
Chiquita Winters is a 80 y.o. female who presents for   Chief Complaint   Patient presents with    Follow-Up from Hospital     Pt was in [facility]    Discuss Medications     Pt would like to discuss what medications will interfer with drinking      and follow up of chronic medical problems.   Patient Active Problem List   Diagnosis    HBP (high blood pressure)    Constitutional obesity    Hypothyroidism    Shortness of breath     HPI  Here for follow-up from hospital admission for abnormal CT scan with a possible bleed and MRI showed a questionable bleed versus mass and patient denies any symptoms at this time and patient refuses home care and I did discuss with patient's daughter at length and patient and daughter both disagree each other with all the issues    Current Outpatient Prescriptions   Medication Sig Dispense Refill    meclizine (ANTIVERT) 12.5 MG tablet Take 12.5 mg by mouth      pantoprazole (PROTONIX) 40 MG tablet Take 40 mg by mouth      Cholecalciferol (VITAMIN D-3) 1000 units CAPS Take 1 tablet by mouth      Calcium Carb-Cholecalciferol (CALCIUM 600+D3 PO) Take 1 tablet by mouth      escitalopram (LEXAPRO) 10 MG tablet Take 1 tablet by mouth daily 30 tablet 5    levothyroxine (SYNTHROID) 50 MCG tablet Take 1 tablet by mouth Daily 30 tablet 5    Multiple Vitamins-Minerals (OCUVITE ADULT 50+) CAPS Take 1 capsule by mouth daily 30 capsule 5    oxybutynin (DITROPAN-XL) 10 MG extended release tablet take 1 tablet by mouth once daily 30 tablet 5    lisinopril-hydrochlorothiazide (PRINZIDE;ZESTORETIC) 20-25 MG per tablet take 1 tablet by mouth once daily (Patient taking differently: take half tablet by mouth once daily) 90 tablet 1    Zinc 22.5 MG TABS Take by mouth daily      metoprolol tartrate (LOPRESSOR) 50 MG tablet Take 50 mg by mouth      mometasone-formoterol (DULERA) 100-5 MCG/ACT inhaler Inhale 2 puffs into the lungs      fluticasone (FLONASE) 50 MCG/ACT nasal spray 1 Assessment:  1. Essential hypertension    2. Lung nodule < 6cm on CT    3. PVC (premature ventricular contraction)    4. Hallucinations    5. Intracranial bleed ([address]    6. Tachycardia        Plan:  Blood pressure stable on current medications and patient was started on metoprolol because of tachycardia when she was in the hospital and patient currently stable and advised patient to continue the same medication and also patient was started on Protonix for stomach during hospitalization and will continue same and patient advised to restart her aspirin 81 mg as recommended by neurosurgery and neurology  Patient's MRI and CT scan reports reviewed and patient currently stable and totally asymptomatic and even her hallucinations have resolved and advised patient to follow with the neurosurgeon and neurology who saw her when she was in the hospital  Patient had a CT scan of the chest done at the same time and upon The lower suspicious nodule and recommended 6 months follow-up and will order a CT scan in 6 months  Patient to follow with cardiology as scheduled  Total time in reviewing the reports examination and discussion with patient and daughter together and separately and also coordinating referral more than 45 minutes  Review in 4 months           1. Shasta received counseling on the following healthy behaviors: nutrition and exercise    2. Prior labs and health maintenance reviewed. 3.  Discussed use, benefit, and side effects of prescribed medications. Barriers to medication compliance addressed. All her questions were answered. Pt voiced understanding. Solo Mt will continue current medications, diet and exercise. No orders of the defined types were placed in this encounter. Completed Refills               Requested Prescriptions      No prescriptions requested or ordered in this encounter     4. Patient given educational materials - see patient instructions    5.  Was a self-tracking

## 2018-10-08 ENCOUNTER — TELEPHONE (OUTPATIENT)
Dept: INTERNAL MEDICINE CLINIC | Age: 83
End: 2018-10-08

## 2018-10-08 DIAGNOSIS — R41.3 MEMORY LOSS: Primary | ICD-10-CM

## 2018-10-08 RX ORDER — LEVOTHYROXINE SODIUM 0.03 MG/1
TABLET ORAL
Qty: 30 TABLET | Refills: 3 | Status: SHIPPED | OUTPATIENT
Start: 2018-10-08 | End: 2018-11-19 | Stop reason: DRUGHIGH

## 2018-10-08 NOTE — TELEPHONE ENCOUNTER
Anabell French's office and I was told since the discharge paperwork did not say anything regarding a follow up appointment they will need referral from us. Referral pended. The neurosurgeon that you asked me to call is a resident, so I called the neurologist that admitted and discharged pt. I pended the referral if you want her to follow up with him, please associate order.  Thank you

## 2018-10-16 ENCOUNTER — TELEPHONE (OUTPATIENT)
Dept: INTERNAL MEDICINE CLINIC | Age: 83
End: 2018-10-16

## 2018-10-16 NOTE — TELEPHONE ENCOUNTER
I did not mention anything about the MRI told her that she needs to see and follow-up with the neurologist

## 2018-10-16 NOTE — TELEPHONE ENCOUNTER
LVM stating Dr. Lazarus Machuca did not mention anything about MRI to his sister and I made referral with the same neurologist patient saw in hospital.

## 2018-11-01 RX ORDER — PANTOPRAZOLE SODIUM 40 MG/1
40 TABLET, DELAYED RELEASE ORAL DAILY
Qty: 30 TABLET | Refills: 2 | Status: SHIPPED | OUTPATIENT
Start: 2018-11-01 | End: 2019-01-26 | Stop reason: SDUPTHER

## 2018-11-07 ENCOUNTER — TELEPHONE (OUTPATIENT)
Dept: INTERNAL MEDICINE CLINIC | Age: 83
End: 2018-11-07

## 2018-11-07 DIAGNOSIS — I10 HYPERTENSION, UNSPECIFIED TYPE: Primary | ICD-10-CM

## 2018-11-19 ENCOUNTER — OFFICE VISIT (OUTPATIENT)
Dept: INTERNAL MEDICINE CLINIC | Age: 83
End: 2018-11-19
Payer: COMMERCIAL

## 2018-11-19 VITALS
SYSTOLIC BLOOD PRESSURE: 130 MMHG | DIASTOLIC BLOOD PRESSURE: 74 MMHG | RESPIRATION RATE: 20 BRPM | WEIGHT: 182.6 LBS | TEMPERATURE: 98 F | BODY MASS INDEX: 30.86 KG/M2 | HEART RATE: 56 BPM

## 2018-11-19 DIAGNOSIS — I62.9 INTRACRANIAL BLEED (HCC): ICD-10-CM

## 2018-11-19 DIAGNOSIS — E03.9 HYPOTHYROIDISM, UNSPECIFIED TYPE: ICD-10-CM

## 2018-11-19 DIAGNOSIS — I10 ESSENTIAL HYPERTENSION: Primary | ICD-10-CM

## 2018-11-19 DIAGNOSIS — Z23 NEED FOR INFLUENZA VACCINATION: ICD-10-CM

## 2018-11-19 PROCEDURE — 90662 IIV NO PRSV INCREASED AG IM: CPT | Performed by: INTERNAL MEDICINE

## 2018-11-19 PROCEDURE — G0008 ADMIN INFLUENZA VIRUS VAC: HCPCS | Performed by: INTERNAL MEDICINE

## 2018-11-19 PROCEDURE — 99214 OFFICE O/P EST MOD 30 MIN: CPT | Performed by: INTERNAL MEDICINE

## 2018-11-19 RX ORDER — LEVOTHYROXINE SODIUM 0.05 MG/1
50 TABLET ORAL DAILY
COMMUNITY
End: 2019-03-22 | Stop reason: ALTCHOICE

## 2018-11-19 NOTE — PROGRESS NOTES
check with the neurologist again  Patient is supposed to get a CT scan of the chest to evaluate lung nodule in March 2019  Patient is confused with her medications send she is supposed to take Synthroid 2 µg but is currently taking 25 µg and advised patient to increase to 50 µg daily and patient also advised to continue metoprolol and amlodipine and lisinopril hydrochlorothiazide for her blood pressure and patient advised to continue Protonix for acid reflux  Patient stopped the citalopram and doing well without any issues regarding depression  Review in 4 months           1. Shasta received counseling on the following healthy behaviors: nutrition and exercise    2. Prior labs and health maintenance reviewed. 3.  Discussed use, benefit, and side effects of prescribed medications. Barriers to medication compliance addressed. All her questions were answered. Pt voiced understanding. Leah Thakkar will continue current medications, diet and exercise. No orders of the defined types were placed in this encounter. Completed Refills               Requested Prescriptions      No prescriptions requested or ordered in this encounter     4. Patient given educational materials - see patient instructions    5. Was a self-tracking handout given in paper form or via [employer]? NO    Orders Placed This Encounter   Procedures    INFLUENZA, HIGH DOSE, 65 YRS +, IM, PF, PREFILL SYR, 0.5ML (FLUZONE HD)     Return in about 4 months (around 3/19/2019). Patient voiced understanding and agreed to treatment plan. Electronically signed by Trang Eduardo MD on 11/19/2018 at 10:22 AM    This note is created with a voice recognition program and while intend to generate a document that accurately reflects the content of the visit, no guarantee can be provided that every mistake has been identified and corrected by editing.

## 2018-12-06 ENCOUNTER — OFFICE VISIT (OUTPATIENT)
Dept: INTERNAL MEDICINE CLINIC | Age: 83
End: 2018-12-06
Payer: COMMERCIAL

## 2018-12-06 VITALS
WEIGHT: 183 LBS | BODY MASS INDEX: 33.68 KG/M2 | HEART RATE: 64 BPM | TEMPERATURE: 97.2 F | DIASTOLIC BLOOD PRESSURE: 82 MMHG | HEIGHT: 62 IN | OXYGEN SATURATION: 98 % | SYSTOLIC BLOOD PRESSURE: 156 MMHG

## 2018-12-06 DIAGNOSIS — I10 ESSENTIAL HYPERTENSION: ICD-10-CM

## 2018-12-06 DIAGNOSIS — M79.89 LEG SWELLING: Primary | ICD-10-CM

## 2018-12-06 PROCEDURE — 99213 OFFICE O/P EST LOW 20 MIN: CPT | Performed by: INTERNAL MEDICINE

## 2018-12-06 NOTE — PROGRESS NOTES
3.  Discussed use, benefit, and side effects of prescribed medications. Barriers to medication compliance addressed. All her questions were answered. Pt voiced understanding. Solo Mt will continue current medications, diet and exercise. No orders of the defined types were placed in this encounter. Completed Refills               Requested Prescriptions      No prescriptions requested or ordered in this encounter     4. Patient given educational materials - see patient instructions    5. Was a self-tracking handout given in paper form or via [employer]? NO    Orders Placed This Encounter   Procedures    Basic Metabolic Panel     Standing Status:   Future     Standing Expiration Date:   12/6/2019     No Follow-up on file. Patient voiced understanding and agreed to treatment plan. Electronically signed by La Chau MD on 12/6/2018 at 10:55 AM    This note is created with a voice recognition program and while intend to generate a document that accurately reflects the content of the visit, no guarantee can be provided that every mistake has been identified and corrected by editing.

## 2018-12-19 ENCOUNTER — TELEPHONE (OUTPATIENT)
Dept: INTERNAL MEDICINE CLINIC | Age: 83
End: 2018-12-19

## 2019-01-25 ENCOUNTER — OFFICE VISIT (OUTPATIENT)
Dept: INTERNAL MEDICINE CLINIC | Age: 84
End: 2019-01-25
Payer: COMMERCIAL

## 2019-01-25 VITALS
BODY MASS INDEX: 33.79 KG/M2 | WEIGHT: 183.6 LBS | SYSTOLIC BLOOD PRESSURE: 132 MMHG | DIASTOLIC BLOOD PRESSURE: 76 MMHG | OXYGEN SATURATION: 98 % | HEART RATE: 80 BPM | TEMPERATURE: 97.6 F | HEIGHT: 62 IN

## 2019-01-25 DIAGNOSIS — I62.9 INTRACRANIAL BLEED (HCC): ICD-10-CM

## 2019-01-25 DIAGNOSIS — I10 ESSENTIAL HYPERTENSION: ICD-10-CM

## 2019-01-25 DIAGNOSIS — M54.42 ACUTE LEFT-SIDED LOW BACK PAIN WITH LEFT-SIDED SCIATICA: Primary | ICD-10-CM

## 2019-01-25 PROCEDURE — 99214 OFFICE O/P EST MOD 30 MIN: CPT | Performed by: INTERNAL MEDICINE

## 2019-01-25 RX ORDER — PREDNISONE 10 MG/1
10 TABLET ORAL
Qty: 15 TABLET | Refills: 0 | Status: SHIPPED | OUTPATIENT
Start: 2019-01-25 | End: 2019-01-30

## 2019-01-28 RX ORDER — PANTOPRAZOLE SODIUM 40 MG/1
TABLET, DELAYED RELEASE ORAL
Qty: 30 TABLET | Refills: 2 | Status: SHIPPED | OUTPATIENT
Start: 2019-01-28 | End: 2019-04-26 | Stop reason: SDUPTHER

## 2019-02-04 RX ORDER — LEVOTHYROXINE SODIUM 0.03 MG/1
TABLET ORAL
Qty: 30 TABLET | Refills: 5 | Status: SHIPPED | OUTPATIENT
Start: 2019-02-04 | End: 2019-07-19 | Stop reason: SDUPTHER

## 2019-02-27 DIAGNOSIS — R06.02 SHORTNESS OF BREATH: Primary | ICD-10-CM

## 2019-03-14 ENCOUNTER — TELEPHONE (OUTPATIENT)
Dept: INTERNAL MEDICINE CLINIC | Age: 84
End: 2019-03-14

## 2019-03-22 ENCOUNTER — OFFICE VISIT (OUTPATIENT)
Dept: INTERNAL MEDICINE CLINIC | Age: 84
End: 2019-03-22
Payer: COMMERCIAL

## 2019-03-22 VITALS
DIASTOLIC BLOOD PRESSURE: 80 MMHG | HEART RATE: 76 BPM | TEMPERATURE: 98.1 F | WEIGHT: 189.2 LBS | SYSTOLIC BLOOD PRESSURE: 132 MMHG | BODY MASS INDEX: 34.82 KG/M2 | RESPIRATION RATE: 16 BRPM | HEIGHT: 62 IN

## 2019-03-22 DIAGNOSIS — Z86.79 HISTORY OF INTRACRANIAL HEMORRHAGE: Primary | ICD-10-CM

## 2019-03-22 DIAGNOSIS — M54.42 CHRONIC LEFT-SIDED LOW BACK PAIN WITH LEFT-SIDED SCIATICA: ICD-10-CM

## 2019-03-22 DIAGNOSIS — G89.29 CHRONIC LEFT-SIDED LOW BACK PAIN WITH LEFT-SIDED SCIATICA: ICD-10-CM

## 2019-03-22 DIAGNOSIS — R91.1 LUNG NODULE: ICD-10-CM

## 2019-03-22 PROCEDURE — 99214 OFFICE O/P EST MOD 30 MIN: CPT | Performed by: INTERNAL MEDICINE

## 2019-03-22 ASSESSMENT — PATIENT HEALTH QUESTIONNAIRE - PHQ9
SUM OF ALL RESPONSES TO PHQ9 QUESTIONS 1 & 2: 0
1. LITTLE INTEREST OR PLEASURE IN DOING THINGS: 0
2. FEELING DOWN, DEPRESSED OR HOPELESS: 0
SUM OF ALL RESPONSES TO PHQ QUESTIONS 1-9: 0
SUM OF ALL RESPONSES TO PHQ QUESTIONS 1-9: 0

## 2019-03-27 DIAGNOSIS — I10 ESSENTIAL HYPERTENSION: ICD-10-CM

## 2019-03-27 DIAGNOSIS — E87.6 HYPOKALEMIA: ICD-10-CM

## 2019-03-27 DIAGNOSIS — R79.89 ELEVATED TSH: ICD-10-CM

## 2019-03-27 DIAGNOSIS — R31.9 HEMATURIA: ICD-10-CM

## 2019-03-28 RX ORDER — LISINOPRIL AND HYDROCHLOROTHIAZIDE 25; 20 MG/1; MG/1
TABLET ORAL
Qty: 90 TABLET | Refills: 1 | Status: SHIPPED | OUTPATIENT
Start: 2019-03-28 | End: 2019-09-17 | Stop reason: SDUPTHER

## 2019-04-01 ENCOUNTER — TELEPHONE (OUTPATIENT)
Dept: INTERNAL MEDICINE CLINIC | Age: 84
End: 2019-04-01

## 2019-04-01 DIAGNOSIS — R91.1 LUNG NODULE: Primary | ICD-10-CM

## 2019-04-01 NOTE — TELEPHONE ENCOUNTER
Per Dr Tiffanie Uribe Cancer shows benign nodule or scarring    Needs CT chest W/O contrast in 1 year    Pt informed, order sent to [facility] scheduling

## 2019-04-08 DIAGNOSIS — G89.29 CHRONIC LEFT-SIDED LOW BACK PAIN WITH LEFT-SIDED SCIATICA: ICD-10-CM

## 2019-04-08 DIAGNOSIS — Z86.79 HISTORY OF INTRACRANIAL HEMORRHAGE: ICD-10-CM

## 2019-04-08 DIAGNOSIS — M54.42 CHRONIC LEFT-SIDED LOW BACK PAIN WITH LEFT-SIDED SCIATICA: ICD-10-CM

## 2019-04-08 DIAGNOSIS — R91.1 LUNG NODULE: ICD-10-CM

## 2019-04-09 ENCOUNTER — TELEPHONE (OUTPATIENT)
Dept: INTERNAL MEDICINE CLINIC | Age: 84
End: 2019-04-09

## 2019-04-09 NOTE — TELEPHONE ENCOUNTER
Pt called back and was informed of results of MRI lumbar spine. Pt states she is not interested in seeing a specialist at this time but will call if pain gets worse. Pt also states she had MRI brain done at the same time as lumbar spine. Results are available in care everywhere.

## 2019-04-09 NOTE — TELEPHONE ENCOUNTER
----- Message from Kp Hunt MD sent at 4/8/2019  1:54 PM EDT -----  Did she get the MRI of the brain which was ordered  MRI of the lumbar spine shows spinal stenosis secondary to arthritis and if patient's symptoms persist patient can follow with orthopedic surgeon or back surgeon

## 2019-04-26 RX ORDER — PANTOPRAZOLE SODIUM 40 MG/1
TABLET, DELAYED RELEASE ORAL
Qty: 30 TABLET | Refills: 2 | Status: SHIPPED | OUTPATIENT
Start: 2019-04-26 | End: 2019-07-19 | Stop reason: SDUPTHER

## 2019-05-16 ENCOUNTER — OFFICE VISIT (OUTPATIENT)
Dept: INTERNAL MEDICINE CLINIC | Age: 84
End: 2019-05-16
Payer: COMMERCIAL

## 2019-05-16 VITALS
TEMPERATURE: 98.3 F | DIASTOLIC BLOOD PRESSURE: 82 MMHG | RESPIRATION RATE: 16 BRPM | BODY MASS INDEX: 35.04 KG/M2 | HEART RATE: 80 BPM | WEIGHT: 191.6 LBS | SYSTOLIC BLOOD PRESSURE: 134 MMHG

## 2019-05-16 DIAGNOSIS — I10 ESSENTIAL HYPERTENSION: Primary | ICD-10-CM

## 2019-05-16 DIAGNOSIS — R06.02 SOB (SHORTNESS OF BREATH): ICD-10-CM

## 2019-05-16 DIAGNOSIS — E03.9 HYPOTHYROIDISM, UNSPECIFIED TYPE: ICD-10-CM

## 2019-05-16 PROCEDURE — 99214 OFFICE O/P EST MOD 30 MIN: CPT | Performed by: INTERNAL MEDICINE

## 2019-05-16 RX ORDER — AMLODIPINE BESYLATE 5 MG/1
5 TABLET ORAL DAILY
Qty: 90 TABLET | Refills: 1 | Status: SHIPPED | OUTPATIENT
Start: 2019-05-16 | End: 2019-06-21

## 2019-05-16 RX ORDER — METOPROLOL TARTRATE 50 MG/1
50 TABLET, FILM COATED ORAL 2 TIMES DAILY
Qty: 60 TABLET | Refills: 5 | Status: SHIPPED | OUTPATIENT
Start: 2019-05-16 | End: 2019-06-21

## 2019-05-16 NOTE — PROGRESS NOTES
Rosa Clayton is a 80 y.o. female who presents for   Chief Complaint   Patient presents with    Check-Up    Hypertension     ran out of metoprolol- no recent labs, had several orders but doesnt go    Shortness of Breath     if she moves fast says she is short winded    and follow up of chronic medical problems. Patient Active Problem List   Diagnosis    HBP (high blood pressure)    Constitutional obesity    Hypothyroidism    Shortness of breath     HPI  Here for follow-up on blood pressure denies any new complaints    Current Outpatient Medications   Medication Sig Dispense Refill    metoprolol tartrate (LOPRESSOR) 50 MG tablet Take 1 tablet by mouth 2 times daily 60 tablet 5    amLODIPine (NORVASC) 5 MG tablet Take 1 tablet by mouth daily 90 tablet 1    pantoprazole (PROTONIX) 40 MG tablet take 1 tablet by mouth once daily 30 tablet 2    lisinopril-hydrochlorothiazide (PRINZIDE;ZESTORETIC) 20-25 MG per tablet take 1 tablet by mouth once daily 90 tablet 1    levothyroxine (SYNTHROID) 25 MCG tablet take 1 tablet by mouth once daily 30 tablet 5    Cholecalciferol (VITAMIN D-3) 1000 units CAPS Take 1 tablet by mouth      Calcium Carb-Cholecalciferol (CALCIUM 600+D3 PO) Take 1 tablet by mouth      Zinc 22.5 MG TABS Take by mouth daily      BIOTIN by Does not apply route.  meclizine (ANTIVERT) 12.5 MG tablet Take 12.5 mg by mouth      escitalopram (LEXAPRO) 10 MG tablet Take 1 tablet by mouth daily 30 tablet 5    aspirin 81 MG tablet Take 81 mg by mouth daily. No current facility-administered medications for this visit. Allergies   Allergen Reactions    Codeine        Past Medical History:   Diagnosis Date    HBP (high blood pressure)     Hematuria 4-21-16    Patient had a complete workup in the past    Hyperglycemia        No past surgical history on file.     Family History   Problem Relation Age of Onset    High Blood Pressure Mother     High Blood Pressure Father     masses  Musculoskeletal:  Normal range of motion and no edema or tenderness bilaterally  No lymphadenopathy  Neurological:  alert, oriented, and normal reflexes bilaterally  Skin: warm and dry  Psychiatric:  normal mood and effect; behavior normal.    Labs:   Lab Results   Component Value Date    LABA1C 5.6 03/08/2018     Lab Results   Component Value Date    CHOL 140 03/08/2018     Lab Results   Component Value Date    HDL 61 03/08/2018     Lab Results   Component Value Date    LDLCALC 64 03/08/2018     Lab Results   Component Value Date    TRIG 73 03/08/2018     No components found for: CHOLHDL  Lab Results   Component Value Date    WBC 7.1 04/21/2017    HGB 13.6 04/21/2017    HCT 40.6 04/21/2017     04/21/2017     No results found for: INR, PROTIME  Lab Results   Component Value Date    GLUCOSE 107 04/25/2018    CREATININE 0.94 04/25/2018    BUN 16 04/25/2018     04/25/2018    K 3.9 04/25/2018     04/25/2018    CO2 28 04/25/2018     Lab Results   Component Value Date    ALT 16 01/22/2014    AST 23 01/22/2014    ALKPHOS 72 01/22/2014    BILITOT 1.0 01/22/2014     Lab Results   Component Value Date    LABALBU 3.7 01/22/2014     Lab Results   Component Value Date    TSH 4.04 07/03/2017     Assessment:  1. Essential hypertension    2. SOB (shortness of breath)    3.  Hypothyroidism, unspecified type        Plan:  Blood pressure is stable on current medications  Patient is currently on Synthroid 25 mcg and in the past and told her to take 48 but still 5 and so advised her to continue same and will repeat lab work  Patient had an MRI of the brain done on 4/7/19 for a follow-up on intracranial hemorrhage and death current MRI is clear and no evidence of any bleeding and also we did MRI of the lumbar spine because of back pain or the same time and shows spinal stenosis and spondylolisthesis but patient does not want to see any back surgeon and currently feeling better  Patient had a CT scan of the chest done which was stable and so repeat to monitor the nodule in one year  Patient complains of 4 shortness of breath but when I asked her that happened many months back when she was mowing the lawn and she has to stop in between but since then she has no issues with a breathing problem and I did advise her to see a cardiologist but patient wants to wait and will call me back if any symptoms recur  Review in 6 months           1. Shasta received counseling on the following healthy behaviors: nutrition and exercise    2. Prior labs and health maintenance reviewed. 3.  Discussed use, benefit, and side effects of prescribed medications. Barriers to medication compliance addressed. All her questions were answered. Pt voiced understanding. Leigha Vences will continue current medications, diet and exercise. Orders Placed This Encounter   Medications    metoprolol tartrate (LOPRESSOR) 50 MG tablet     Sig: Take 1 tablet by mouth 2 times daily     Dispense:  60 tablet     Refill:  5    amLODIPine (NORVASC) 5 MG tablet     Sig: Take 1 tablet by mouth daily     Dispense:  90 tablet     Refill:  1          Completed Refills               Requested Prescriptions     Signed Prescriptions Disp Refills    metoprolol tartrate (LOPRESSOR) 50 MG tablet 60 tablet 5     Sig: Take 1 tablet by mouth 2 times daily    amLODIPine (NORVASC) 5 MG tablet 90 tablet 1     Sig: Take 1 tablet by mouth daily     4. Patient given educational materials - see patient instructions    5. Was a self-tracking handout given in paper form or via [employer]? NO    No orders of the defined types were placed in this encounter. Return in about 6 months (around 11/16/2019). Patient voiced understanding and agreed to treatment plan.      Electronically signed by Rae Bowie MD on 5/16/2019 at 12:22 PM    This note is created with a voice recognition program and while intend to generate a document that accurately reflects the content of the visit, no guarantee can be provided that every mistake has been identified and corrected by editing.

## 2019-05-16 NOTE — PROGRESS NOTES
Visit Information    Have you changed or started any medications since your last visit including any over-the-counter medicines, vitamins, or herbal medicines? yes - see notes   Are you having any side effects from any of your medications? -  no  Have you stopped taking any of your medications? Is so, why? -  yes - see notes    Have you seen any other physician or provider since your last visit? No  Have you had any other diagnostic tests since your last visit? No  Have you been seen in the emergency room and/or had an admission to a hospital since we last saw you? No  Have you had your routine dental cleaning in the past 6 months? yes    Have you activated your [employer] account? If not, what are your barriers?  Yes     Patient Care Team:  Meng Lujan MD as PCP - General (Internal Medicine)    Medical History Review  Past Medical, Family, and Social History reviewed and does contribute to the patient presenting condition    Health Maintenance   Topic Date Due    Shingles Vaccine (1 of 2) 02/10/1985    TSH testing  03/08/2019    Potassium monitoring  04/25/2019    Creatinine monitoring  04/25/2019    DTaP/Tdap/Td vaccine (1 - Tdap) 10[birth year]/2019 (Originally 2/10/1954)    Flu vaccine  Completed    Pneumococcal 65+ years Vaccine  Completed    DEXA (modify frequency per FRAX score)  Addressed

## 2019-06-10 LAB
ALBUMIN SERPL-MCNC: 4 G/DL
ALP BLD-CCNC: 74 U/L
ALT SERPL-CCNC: 14 U/L
ANION GAP SERPL CALCULATED.3IONS-SCNC: 7 MMOL/L
AST SERPL-CCNC: 21 U/L
BASOPHILS ABSOLUTE: NORMAL /ΜL
BASOPHILS RELATIVE PERCENT: NORMAL %
BILIRUB SERPL-MCNC: 0.7 MG/DL (ref 0.1–1.4)
BUN BLDV-MCNC: 14 MG/DL
CALCIUM SERPL-MCNC: 9.4 MG/DL
CHLORIDE BLD-SCNC: 107 MMOL/L
CO2: 28 MMOL/L
CREAT SERPL-MCNC: 0.95 MG/DL
EOSINOPHILS ABSOLUTE: NORMAL /ΜL
EOSINOPHILS RELATIVE PERCENT: NORMAL %
GFR CALCULATED: NORMAL
GLUCOSE BLD-MCNC: 92 MG/DL
HCT VFR BLD CALC: 41.2 % (ref 36–46)
HEMOGLOBIN: 14 G/DL (ref 12–16)
LYMPHOCYTES ABSOLUTE: NORMAL /ΜL
LYMPHOCYTES RELATIVE PERCENT: NORMAL %
MCH RBC QN AUTO: 31.3 PG
MCHC RBC AUTO-ENTMCNC: 33.9 G/DL
MCV RBC AUTO: 92 FL
MONOCYTES ABSOLUTE: NORMAL /ΜL
MONOCYTES RELATIVE PERCENT: NORMAL %
NEUTROPHILS ABSOLUTE: NORMAL /ΜL
NEUTROPHILS RELATIVE PERCENT: NORMAL %
PLATELET # BLD: 184 K/ΜL
PMV BLD AUTO: 10.8 FL
POTASSIUM SERPL-SCNC: 4.4 MMOL/L
RBC # BLD: NORMAL 10^6/ΜL
SODIUM BLD-SCNC: 142 MMOL/L
T4 FREE: 0.97
TOTAL PROTEIN: 6.8
TSH SERPL DL<=0.05 MIU/L-ACNC: 3.01 UIU/ML
WBC # BLD: 7.3 10^3/ML

## 2019-06-11 DIAGNOSIS — R06.02 SOB (SHORTNESS OF BREATH): ICD-10-CM

## 2019-06-11 DIAGNOSIS — I10 ESSENTIAL HYPERTENSION: ICD-10-CM

## 2019-06-11 DIAGNOSIS — E03.9 HYPOTHYROIDISM, UNSPECIFIED TYPE: ICD-10-CM

## 2019-06-21 ENCOUNTER — TELEPHONE (OUTPATIENT)
Dept: INTERNAL MEDICINE CLINIC | Age: 84
End: 2019-06-21

## 2019-06-21 NOTE — TELEPHONE ENCOUNTER
Advised patient not to take the metoprolol and stay on lisinopril hydrochlorothiazide and also check if she is taking Norvasc or amlodipine  And please cancel the metoprolol prescription

## 2019-06-21 NOTE — TELEPHONE ENCOUNTER
Spoke with pt. Pt states she has always only been on lisinopril-hctz for BP. States she was never on amlodipine or metoprolol. Med list updated to reflect changes.

## 2019-07-19 RX ORDER — LEVOTHYROXINE SODIUM 0.03 MG/1
TABLET ORAL
Qty: 30 TABLET | Refills: 5 | Status: SHIPPED | OUTPATIENT
Start: 2019-07-19 | End: 2019-12-17 | Stop reason: SDUPTHER

## 2019-07-19 RX ORDER — PANTOPRAZOLE SODIUM 40 MG/1
TABLET, DELAYED RELEASE ORAL
Qty: 30 TABLET | Refills: 2 | Status: SHIPPED | OUTPATIENT
Start: 2019-07-19 | End: 2019-10-13 | Stop reason: SDUPTHER

## 2019-07-19 NOTE — TELEPHONE ENCOUNTER
Last visit: 5-16-19  Last Med refill:   Does patient have enough medication for 72 hours: No:     Next Visit Date:  Future Appointments   Date Time Provider Jade Keller   11/11/2019 12:00 PM Estevan Hartmann MD [facility] [address] Maintenance   Topic Date Due    Shingles Vaccine (1 of 2) 02/10/1985    Annual Wellness Visit (AWV)  12/20/2016    DTaP/Tdap/Td vaccine (1 - Tdap) 10/24/2019 (Originally 2/10/1954)    Flu vaccine (1) 09/01/2019    TSH testing  06/10/2020    Potassium monitoring  06/10/2020    Creatinine monitoring  06/10/2020    Pneumococcal 65+ years Vaccine  Completed    DEXA (modify frequency per FRAX score)  Addressed       Hemoglobin A1C (%)   Date Value   03/08/2018 5.6   12/29/2016 5.6   04/08/2016 6.0             ( goal A1C is < 7)   No results found for: LABMICR  LDL Calculated (mg/dL)   Date Value   03/08/2018 64   12/29/2016 75       (goal LDL is <100)   AST (U/L)   Date Value   06/10/2019 21     ALT (U/L)   Date Value   06/10/2019 14     BUN (mg/dL)   Date Value   06/10/2019 14     BP Readings from Last 3 Encounters:   05/16/19 134/82   03/22/19 132/80   01/25/19 132/76          (goal 120/80)    All Future Testing planned in [facility]  Lab Frequency Next Occurrence   Urinalysis Once 09/25/2018   Urine Culture Once 09/25/2018   Comprehensive Metabolic Panel Once 40/73/9634   CBC Once 09/25/2018   Magnesium Once 09/25/2018   TSH without Reflex Once 09/25/2018   T4, Free Once 09/25/2018   BUN Once 11/07/2018   Creatinine, Serum Once 24/93/5170   Basic Metabolic Panel Once 45/57/8346   XR LUMBAR SPINE (2-3 VIEWS) Once 01/25/2019   CT CHEST W CONTRAST Once 02/27/2020   MRI BRAIN W WO CONTRAST Once 03/27/2019   CT CHEST WO CONTRAST Once 04/01/2020               Patient Active Problem List:     HBP (high blood pressure)     Constitutional obesity     Hypothyroidism     Shortness of breath

## 2019-07-25 ENCOUNTER — TELEPHONE (OUTPATIENT)
Dept: INTERNAL MEDICINE CLINIC | Age: 84
End: 2019-07-25

## 2019-08-19 ENCOUNTER — TELEPHONE (OUTPATIENT)
Dept: OTHER | Age: 84
End: 2019-08-19

## 2019-09-17 DIAGNOSIS — I10 ESSENTIAL HYPERTENSION: ICD-10-CM

## 2019-09-17 DIAGNOSIS — R79.89 ELEVATED TSH: ICD-10-CM

## 2019-09-17 DIAGNOSIS — E87.6 HYPOKALEMIA: ICD-10-CM

## 2019-09-17 DIAGNOSIS — R31.9 HEMATURIA: ICD-10-CM

## 2019-09-17 RX ORDER — LISINOPRIL AND HYDROCHLOROTHIAZIDE 25; 20 MG/1; MG/1
TABLET ORAL
Qty: 90 TABLET | Refills: 0 | Status: SHIPPED | OUTPATIENT
Start: 2019-09-17 | End: 2019-12-12 | Stop reason: SDUPTHER

## 2019-10-14 RX ORDER — PANTOPRAZOLE SODIUM 40 MG/1
TABLET, DELAYED RELEASE ORAL
Qty: 30 TABLET | Refills: 0 | Status: SHIPPED | OUTPATIENT
Start: 2019-10-14 | End: 2020-03-05 | Stop reason: ALTCHOICE

## 2019-12-12 DIAGNOSIS — R79.89 ELEVATED TSH: ICD-10-CM

## 2019-12-12 DIAGNOSIS — E87.6 HYPOKALEMIA: ICD-10-CM

## 2019-12-12 DIAGNOSIS — R31.9 HEMATURIA: ICD-10-CM

## 2019-12-12 DIAGNOSIS — I10 ESSENTIAL HYPERTENSION: ICD-10-CM

## 2019-12-12 RX ORDER — LISINOPRIL AND HYDROCHLOROTHIAZIDE 25; 20 MG/1; MG/1
TABLET ORAL
Qty: 30 TABLET | Refills: 0 | Status: SHIPPED | OUTPATIENT
Start: 2019-12-12 | End: 2019-12-17 | Stop reason: SDUPTHER

## 2019-12-17 ENCOUNTER — OFFICE VISIT (OUTPATIENT)
Dept: INTERNAL MEDICINE CLINIC | Age: 84
End: 2019-12-17
Payer: COMMERCIAL

## 2019-12-17 VITALS
WEIGHT: 193 LBS | HEIGHT: 62 IN | DIASTOLIC BLOOD PRESSURE: 80 MMHG | HEART RATE: 97 BPM | OXYGEN SATURATION: 98 % | BODY MASS INDEX: 35.51 KG/M2 | SYSTOLIC BLOOD PRESSURE: 138 MMHG | TEMPERATURE: 97.1 F

## 2019-12-17 DIAGNOSIS — E03.9 HYPOTHYROIDISM, UNSPECIFIED TYPE: Primary | ICD-10-CM

## 2019-12-17 DIAGNOSIS — F41.1 GENERALIZED ANXIETY DISORDER: ICD-10-CM

## 2019-12-17 DIAGNOSIS — I10 ESSENTIAL HYPERTENSION: ICD-10-CM

## 2019-12-17 PROCEDURE — 99214 OFFICE O/P EST MOD 30 MIN: CPT | Performed by: INTERNAL MEDICINE

## 2019-12-17 RX ORDER — LISINOPRIL AND HYDROCHLOROTHIAZIDE 25; 20 MG/1; MG/1
TABLET ORAL
Qty: 30 TABLET | Refills: 5 | Status: SHIPPED | OUTPATIENT
Start: 2019-12-17 | End: 2020-08-25 | Stop reason: SDUPTHER

## 2019-12-17 RX ORDER — LEVOTHYROXINE SODIUM 0.03 MG/1
TABLET ORAL
Qty: 30 TABLET | Refills: 5 | Status: SHIPPED | OUTPATIENT
Start: 2019-12-17 | End: 2020-08-25 | Stop reason: SDUPTHER

## 2019-12-17 RX ORDER — ESCITALOPRAM OXALATE 10 MG/1
10 TABLET ORAL DAILY
Qty: 30 TABLET | Refills: 5 | Status: SHIPPED | OUTPATIENT
Start: 2019-12-17 | End: 2020-08-05

## 2020-02-12 ENCOUNTER — TELEPHONE (OUTPATIENT)
Dept: INTERNAL MEDICINE CLINIC | Age: 85
End: 2020-02-12

## 2020-02-12 RX ORDER — AZITHROMYCIN 250 MG/1
250 TABLET, FILM COATED ORAL SEE ADMIN INSTRUCTIONS
Qty: 6 TABLET | Refills: 0 | Status: SHIPPED | OUTPATIENT
Start: 2020-02-12 | End: 2020-02-17

## 2020-03-05 ENCOUNTER — OFFICE VISIT (OUTPATIENT)
Dept: INTERNAL MEDICINE CLINIC | Age: 85
End: 2020-03-05
Payer: COMMERCIAL

## 2020-03-05 VITALS
RESPIRATION RATE: 16 BRPM | BODY MASS INDEX: 34.41 KG/M2 | HEART RATE: 76 BPM | WEIGHT: 188.2 LBS | SYSTOLIC BLOOD PRESSURE: 138 MMHG | TEMPERATURE: 97.6 F | DIASTOLIC BLOOD PRESSURE: 74 MMHG

## 2020-03-05 PROCEDURE — 99214 OFFICE O/P EST MOD 30 MIN: CPT | Performed by: INTERNAL MEDICINE

## 2020-03-05 ASSESSMENT — PATIENT HEALTH QUESTIONNAIRE - PHQ9
1. LITTLE INTEREST OR PLEASURE IN DOING THINGS: 1
SUM OF ALL RESPONSES TO PHQ QUESTIONS 1-9: 2
SUM OF ALL RESPONSES TO PHQ9 QUESTIONS 1 & 2: 2
2. FEELING DOWN, DEPRESSED OR HOPELESS: 1
SUM OF ALL RESPONSES TO PHQ QUESTIONS 1-9: 2

## 2020-03-05 NOTE — PROGRESS NOTES
Chiara Guevara is a 80 y.o. female who presents for   Chief Complaint   Patient presents with   Brijesh Cardozo     denies complaints today    Memory Loss     at times when she is driving she forgets where she is going, said hasnt gotten lost     Hypertension     check up, very unsure of her medications and supplements    Insomnia     difficulty falling asleep    and follow up of chronic medical problems. Patient Active Problem List   Diagnosis    HBP (high blood pressure)    Constitutional obesity    Hypothyroidism    Shortness of breath     HPI  Here for follow-up on blood pressure and cholesterol and thyroid denies any new complaints    Current Outpatient Medications   Medication Sig Dispense Refill    lisinopril-hydrochlorothiazide (PRINZIDE;ZESTORETIC) 20-25 MG per tablet take 1 tablet by mouth once daily 30 tablet 5    escitalopram (LEXAPRO) 10 MG tablet Take 1 tablet by mouth daily 30 tablet 5    levothyroxine (SYNTHROID) 25 MCG tablet take 1 tablet by mouth once daily 30 tablet 5    Cholecalciferol (VITAMIN D-3) 1000 units CAPS Take 1 tablet by mouth      Calcium Carb-Cholecalciferol (CALCIUM 600+D3 PO) Take 1 tablet by mouth      BIOTIN by Does not apply route.  aspirin 81 MG tablet Take 81 mg by mouth daily as needed        No current facility-administered medications for this visit. Allergies   Allergen Reactions    Codeine        Past Medical History:   Diagnosis Date    HBP (high blood pressure)     Hematuria 4-21-16    Patient had a complete workup in the past    Hyperglycemia        No past surgical history on file.     Family History   Problem Relation Age of Onset    High Blood Pressure Mother     High Blood Pressure Father     Asthma Brother     Cancer Maternal Uncle         colon     ROS   Constitutional:  Negative for fatigue, loss of appetite and unexpected weight change   HEENT            : Negative for neck stiffness and pain, no congestion or sinus pressure   Eyes                : No visual disturbance or pain   Cardiovascular: No chest pain or palpitations or leg swelling   Respiratory      : Negative for cough, shortness of breath or wheezing   Gastrointestinal: Negative for abdominal pain, constipation or diarrhea and bloating No nausea or vomiting   Genitourinary:     No urgency or frequency, no burning or hematuria   Musculoskeletal: No arthralgias, back pain or myalgias   Skin                  : Negative for rash or erythema   Neurological    : Negative for dizziness, weakness, tremors ,light headedness or syncope   Psychiatric       : Negative for dysphoric mood, sleep disturbances, nervous or anxious, or decreased concentration   All other review of systems was negative    Objective  Physical Examination:    Nursing note reviewed    /74 (Site: Right Upper Arm, Position: Sitting, Cuff Size: Medium Adult)   Pulse 76   Temp 97.6 °F (36.4 °C) (Infrared)   Resp 16   Wt 188 lb 3.2 oz (85.4 kg)   BMI 34.41 kg/m²   BP Readings from Last 3 Encounters:   03/05/20 138/74   12/17/19 138/80   05/16/19 134/82         Constitutional:  Joanne Cruz is oriented to place, person and time ,appears well-developed and well-nourished  HEENT:  Atraumatic and normocephalic, external ears normal bilaterally, nose normal no oropharyngeal exudate and is clear and moist  Eyes:  EOCM normal; conjunctivae normal; PERRLA bilaterally  Neck:  Normal range of motion, neck supple, no JVD and no thyromegaly  Cardiovascular:  RRR, normal heart sounds and intact distal pulses  Pulmonary:  effort normal and breath sounds normal bilaterally,no wheezes or rales, no respiratory distress  Abdominal:  Soft, non-tender; normal bowel sounds, no masses  Musculoskeletal:  Normal range of motion and no edema or tenderness bilaterally  No lymphadenopathy  Neurological:  alert, oriented, and normal reflexes bilaterally  Skin: warm and dry  Psychiatric:  normal mood and effect; behavior normal.    Labs:   Lab Results   Component Value Date    LABA1C 5.6 03/08/2018     Lab Results   Component Value Date    CHOL 140 03/08/2018     Lab Results   Component Value Date    HDL 61 03/08/2018     Lab Results   Component Value Date    LDLCALC 64 03/08/2018     Lab Results   Component Value Date    TRIG 73 03/08/2018     No components found for: [place], [place]  Lab Results   Component Value Date    WBC 7.3 06/10/2019    HGB 14.0 06/10/2019    HCT 41.2 06/10/2019    MCV 92 06/10/2019     06/10/2019     No results found for: INR, PROTIME  Lab Results   Component Value Date    GLUCOSE 92 06/10/2019    CREATININE 0.95 06/10/2019    BUN 14 06/10/2019     06/10/2019    K 4.4 06/10/2019     06/10/2019    CO2 28 06/10/2019     Lab Results   Component Value Date    ALT 14 06/10/2019    AST 21 06/10/2019    ALKPHOS 74 06/10/2019    BILITOT 0.7 06/10/2019     Lab Results   Component Value Date    LABALBU 4.0 06/10/2019     Lab Results   Component Value Date    TSH 3.01 06/10/2019     Assessment:  1. Essential hypertension    2. Hypothyroidism, unspecified type    3. Memory problem    4. Difficulty sleeping        Plan:  Patient's blood pressure is stable on current medications  Last TSH was 3.01 and continue current dose of Synthroid and will repeat lab work at next visit  Patient states that she could not sleep at least half an hour to 45 minutes after she goes to bed and I explained her that is not abnormal and will continue current treatment and if needed we will add more medications like trazodone  Patient has some difficulty with the memories like better while driving she suddenly feels where she is going and then recovers and drive safely and has been back to the place where she was supposed to go and never lost her way and had no accident recently and we did in an MMSE in the office and it showed 27/30 and advised patient to continue current medications   Review in 4 months           1.   Martha Cole received counseling on the following healthy behaviors: nutrition and exercise    2. Prior labs and health maintenance reviewed. 3.  Discussed use, benefit, and side effects of prescribed medications. Barriers to medication compliance addressed. All her questions were answered. Pt voiced understanding. Ke Perez will continue current medications, diet and exercise. No orders of the defined types were placed in this encounter. Completed Refills               Requested Prescriptions      No prescriptions requested or ordered in this encounter     4. Patient given educational materials - see patient instructions    5. Was a self-tracking handout given in paper form or via [employer]? NO    No orders of the defined types were placed in this encounter. No follow-ups on file. Patient voiced understanding and agreed to treatment plan. Electronically signed by Landon Marx MD on 3/5/2020 at 3:17 PM    This note is created with a voice recognition program and while intend to generate a document that accurately reflects the content of the visit, no guarantee can be provided that every mistake has been identified and corrected by editing.

## 2020-03-10 ENCOUNTER — NURSE TRIAGE (OUTPATIENT)
Dept: OTHER | Facility: CLINIC | Age: 85
End: 2020-03-10

## 2020-03-10 NOTE — TELEPHONE ENCOUNTER
Reason for Disposition   No answer. First attempt to contact caller. Follow-up call scheduled within 15 minutes. Protocols used: NO CONTACT OR DUPLICATE CONTACT CALL-ADULT-[place]    Patient called [facility]) Liliya Duff) to schedule appointment, with red flag complaint, transferred to RN access for triage, except that soft transfer did not go through. So, this writer called back pt twice, but there is no answer.

## 2020-04-14 ENCOUNTER — TELEPHONE (OUTPATIENT)
Dept: INTERNAL MEDICINE CLINIC | Age: 85
End: 2020-04-14

## 2020-07-10 ENCOUNTER — TELEPHONE (OUTPATIENT)
Dept: FAMILY MEDICINE CLINIC | Age: 85
End: 2020-07-10

## 2020-07-10 NOTE — TELEPHONE ENCOUNTER
Pedro Quiroz was contacted to set up an annual wellness visit    Spoke with: left message to schedule angelina Marques

## 2020-08-05 RX ORDER — ESCITALOPRAM OXALATE 10 MG/1
TABLET ORAL
Qty: 30 TABLET | Refills: 1 | Status: SHIPPED | OUTPATIENT
Start: 2020-08-05 | End: 2020-08-25 | Stop reason: SDUPTHER

## 2020-08-25 ENCOUNTER — OFFICE VISIT (OUTPATIENT)
Dept: INTERNAL MEDICINE CLINIC | Age: 85
End: 2020-08-25
Payer: COMMERCIAL

## 2020-08-25 VITALS
BODY MASS INDEX: 31.82 KG/M2 | DIASTOLIC BLOOD PRESSURE: 74 MMHG | RESPIRATION RATE: 16 BRPM | TEMPERATURE: 97.2 F | HEIGHT: 64 IN | WEIGHT: 186.4 LBS | SYSTOLIC BLOOD PRESSURE: 136 MMHG | HEART RATE: 68 BPM

## 2020-08-25 PROCEDURE — 99214 OFFICE O/P EST MOD 30 MIN: CPT | Performed by: INTERNAL MEDICINE

## 2020-08-25 RX ORDER — MECLIZINE HCL 12.5 MG/1
12.5 TABLET ORAL 3 TIMES DAILY PRN
Qty: 30 TABLET | Refills: 0 | Status: SHIPPED | OUTPATIENT
Start: 2020-08-25 | End: 2020-09-04

## 2020-08-25 RX ORDER — LISINOPRIL AND HYDROCHLOROTHIAZIDE 25; 20 MG/1; MG/1
TABLET ORAL
Qty: 30 TABLET | Refills: 5 | Status: SHIPPED | OUTPATIENT
Start: 2020-08-25 | End: 2021-04-27 | Stop reason: SDUPTHER

## 2020-08-25 RX ORDER — LEVOTHYROXINE SODIUM 0.03 MG/1
TABLET ORAL
Qty: 30 TABLET | Refills: 5 | Status: SHIPPED | OUTPATIENT
Start: 2020-08-25 | End: 2021-04-27 | Stop reason: SDUPTHER

## 2020-08-25 RX ORDER — ESCITALOPRAM OXALATE 10 MG/1
TABLET ORAL
Qty: 90 TABLET | Refills: 1 | Status: SHIPPED | OUTPATIENT
Start: 2020-08-25 | End: 2021-04-27 | Stop reason: SDUPTHER

## 2020-08-25 ASSESSMENT — PATIENT HEALTH QUESTIONNAIRE - PHQ9
SUM OF ALL RESPONSES TO PHQ9 QUESTIONS 1 & 2: 0
2. FEELING DOWN, DEPRESSED OR HOPELESS: 0
1. LITTLE INTEREST OR PLEASURE IN DOING THINGS: 0
SUM OF ALL RESPONSES TO PHQ QUESTIONS 1-9: 0
SUM OF ALL RESPONSES TO PHQ QUESTIONS 1-9: 0

## 2020-08-25 NOTE — PROGRESS NOTES
Ashley Ramon is a 80 y.o. female who presents for   Chief Complaint   Patient presents with    Hypertension     check up    Memory Loss     daughter bought her prevagen for memory, has inly taken twice so far    Insomnia     difficulty sleeping- feels lexapro helps her sleep    Other     feels like she is watching herslf from the outside in    and follow up of chronic medical problems. Patient Active Problem List   Diagnosis    HBP (high blood pressure)    Constitutional obesity    Hypothyroidism    Shortness of breath     HPI  Here for follow-up on blood pressure denies any new complaints and have memory issues and patient states that she is started on Praceta [place] 58 by her daughter and asking about my opinion and told them that it may help or may not help but if they want to can take it and no significant interactions with other medications  Current Outpatient Medications   Medication Sig Dispense Refill    Apoaequorin (PREVAGEN) 10 MG CAPS Take 1 capsule by mouth daily      lisinopril-hydroCHLOROthiazide (PRINZIDE;ZESTORETIC) 20-25 MG per tablet take 1 tablet by mouth once daily 30 tablet 5    levothyroxine (SYNTHROID) 25 MCG tablet take 1 tablet by mouth once daily 30 tablet 5    escitalopram (LEXAPRO) 10 MG tablet take 1 tablet by mouth once daily 90 tablet 1    meclizine (ANTIVERT) 12.5 MG tablet Take 1 tablet by mouth 3 times daily as needed for Dizziness 30 tablet 0    Cholecalciferol (VITAMIN D-3) 1000 units CAPS Take 1 tablet by mouth      Calcium Carb-Cholecalciferol (CALCIUM 600+D3 PO) Take 1 tablet by mouth      BIOTIN by Does not apply route. No current facility-administered medications for this visit. Allergies   Allergen Reactions    Codeine        Past Medical History:   Diagnosis Date    HBP (high blood pressure)     Hematuria 4-21-16    Patient had a complete workup in the past    Hyperglycemia        No past surgical history on file.     Family History   Problem Relation Age of Onset    High Blood Pressure Mother     High Blood Pressure Father     Asthma Brother     Cancer Maternal Uncle         colon     ROS   Constitutional:  Negative for fatigue, loss of appetite and unexpected weight change   HEENT            : Negative for neck stiffness and pain, no congestion or sinus pressure   Eyes                : No visual disturbance or pain   Cardiovascular: No chest pain or palpitations or leg swelling   Respiratory      : Negative for cough, shortness of breath or wheezing   Gastrointestinal: Negative for abdominal pain, constipation or diarrhea and bloating No nausea or vomiting   Genitourinary:     No urgency or frequency, no burning or hematuria   Musculoskeletal: No arthralgias, back pain or myalgias   Skin                  : Negative for rash or erythema   Neurological    : Negative for dizziness, weakness, tremors ,light headedness or syncope   Psychiatric       : Negative for dysphoric mood, sleep disturbances, nervous or anxious, or decreased concentration   All other review of systems was negative    Objective  Physical Examination:    Nursing note reviewed    /74 (Site: Left Upper Arm, Position: Sitting, Cuff Size: Large Adult)   Pulse 68   Temp 97.2 °F (36.2 °C) (Infrared)   Resp 16   Ht 5' 4\" (1.626 m)   Wt 186 lb 6.4 oz (84.6 kg)   BMI 32.00 kg/m²   BP Readings from Last 3 Encounters:   08/25/20 136/74   03/05/20 138/74   12/17/19 138/80         Constitutional:  Abisai Shah is oriented to place, person and time ,appears well-developed and well-nourished  HEENT:  Atraumatic and normocephalic, external ears normal bilaterally, nose normal no oropharyngeal exudate and is clear and moist  Eyes:  EOCM normal; conjunctivae normal; PERRLA bilaterally  Neck:  Normal range of motion, neck supple, no JVD and no thyromegaly  Cardiovascular:  RRR, normal heart sounds and intact distal pulses  Pulmonary:  effort normal and breath sounds normal bilaterally,no wheezes or rales, no respiratory distress  Abdominal:  Soft, non-tender; normal bowel sounds, no masses  Musculoskeletal:  Normal range of motion and no edema or tenderness bilaterally  No lymphadenopathy  Neurological:  alert, oriented, and normal reflexes bilaterally  Skin: warm and dry  Psychiatric:  normal mood and effect; behavior normal.    Labs:   Lab Results   Component Value Date    LABA1C 5.6 03/08/2018     Lab Results   Component Value Date    CHOL 140 03/08/2018     Lab Results   Component Value Date    HDL 61 03/08/2018     Lab Results   Component Value Date    LDLCALC 64 03/08/2018     Lab Results   Component Value Date    TRIG 73 03/08/2018     No components found for: CHOLHDL  Lab Results   Component Value Date    WBC 7.3 06/10/2019    HGB 14.0 06/10/2019    HCT 41.2 06/10/2019    MCV 92 06/10/2019     06/10/2019     No results found for: INR, PROTIME  Lab Results   Component Value Date    GLUCOSE 92 06/10/2019    CREATININE 0.95 06/10/2019    BUN 14 06/10/2019     06/10/2019    K 4.4 06/10/2019     06/10/2019    CO2 28 06/10/2019     Lab Results   Component Value Date    ALT 14 06/10/2019    AST 21 06/10/2019    ALKPHOS 74 06/10/2019    BILITOT 0.7 06/10/2019     Lab Results   Component Value Date    LABALBU 4.0 06/10/2019     Lab Results   Component Value Date    TSH 3.01 06/10/2019     Assessment:  1. Hypothyroidism, unspecified type    2. Essential hypertension    3. Memory problem    4. Vertigo    5. Difficulty sleeping    6.  Generalized anxiety disorder        Plan:  Patient's blood pressure is stable on current medications and continue lisinopril hydrochlorothiazide and a new prescription was given  Patient was still carrying amlodipine that she was not taking and I discarded those bottles  Patient's last TSH was 3.01 and continue current dose of Synthroid 25 mcg daily and a new prescription was given  Patient not taking the Lexapro every day and advised her tablet 30 tablet 5     Sig: take 1 tablet by mouth once daily    escitalopram (LEXAPRO) 10 MG tablet 90 tablet 1     Sig: take 1 tablet by mouth once daily    meclizine (ANTIVERT) 12.5 MG tablet 30 tablet 0     Sig: Take 1 tablet by mouth 3 times daily as needed for Dizziness     4. Patient given educational materials - see patient instructions    5. Was a self-tracking handout given in paper form or via [employer]? NO    Orders Placed This Encounter   Procedures    Comprehensive Metabolic Panel     Standing Status:   Future     Standing Expiration Date:   8/25/2021    CBC     Standing Status:   Future     Standing Expiration Date:   8/25/2021    T4, Free     Standing Status:   Future     Standing Expiration Date:   8/25/2021    Lipid Panel     Standing Status:   Future     Standing Expiration Date:   8/25/2021     Order Specific Question:   Is Patient Fasting?/# of Hours     Answer:   15    TSH without Reflex     Standing Status:   Future     Standing Expiration Date:   8/25/2021     No follow-ups on file. Patient voiced understanding and agreed to treatment plan. Electronically signed by Zhanna Maldonado MD on 8/25/2020 at 3:02 PM    This note is created with a voice recognition program and while intend to generate a document that accurately reflects the content of the visit, no guarantee can be provided that every mistake has been identified and corrected by editing.

## 2021-04-27 ENCOUNTER — OFFICE VISIT (OUTPATIENT)
Dept: INTERNAL MEDICINE CLINIC | Age: 86
End: 2021-04-27
Payer: COMMERCIAL

## 2021-04-27 VITALS
TEMPERATURE: 97.4 F | RESPIRATION RATE: 16 BRPM | BODY MASS INDEX: 29.81 KG/M2 | WEIGHT: 174.6 LBS | DIASTOLIC BLOOD PRESSURE: 90 MMHG | HEIGHT: 64 IN | HEART RATE: 76 BPM | SYSTOLIC BLOOD PRESSURE: 180 MMHG

## 2021-04-27 DIAGNOSIS — R91.1 LUNG NODULE: ICD-10-CM

## 2021-04-27 DIAGNOSIS — I10 ESSENTIAL HYPERTENSION: ICD-10-CM

## 2021-04-27 DIAGNOSIS — F41.1 GENERALIZED ANXIETY DISORDER: ICD-10-CM

## 2021-04-27 DIAGNOSIS — E03.9 HYPOTHYROIDISM, UNSPECIFIED TYPE: Primary | ICD-10-CM

## 2021-04-27 DIAGNOSIS — R41.3 MEMORY PROBLEM: ICD-10-CM

## 2021-04-27 PROCEDURE — 99215 OFFICE O/P EST HI 40 MIN: CPT | Performed by: INTERNAL MEDICINE

## 2021-04-27 RX ORDER — LISINOPRIL AND HYDROCHLOROTHIAZIDE 25; 20 MG/1; MG/1
TABLET ORAL
Qty: 90 TABLET | Refills: 1 | Status: SHIPPED | OUTPATIENT
Start: 2021-04-27 | End: 2021-10-18 | Stop reason: SDUPTHER

## 2021-04-27 RX ORDER — ESCITALOPRAM OXALATE 10 MG/1
TABLET ORAL
Qty: 90 TABLET | Refills: 1 | Status: SHIPPED | OUTPATIENT
Start: 2021-04-27 | End: 2021-08-23

## 2021-04-27 RX ORDER — AMLODIPINE BESYLATE 5 MG/1
5 TABLET ORAL DAILY
Qty: 30 TABLET | Refills: 2 | Status: SHIPPED | OUTPATIENT
Start: 2021-04-27 | End: 2021-08-23

## 2021-04-27 RX ORDER — LEVOTHYROXINE SODIUM 0.03 MG/1
TABLET ORAL
Qty: 90 TABLET | Refills: 1 | Status: SHIPPED | OUTPATIENT
Start: 2021-04-27 | End: 2021-10-18 | Stop reason: SDUPTHER

## 2021-04-27 ASSESSMENT — PATIENT HEALTH QUESTIONNAIRE - PHQ9
SUM OF ALL RESPONSES TO PHQ9 QUESTIONS 1 & 2: 2
SUM OF ALL RESPONSES TO PHQ QUESTIONS 1-9: 2
2. FEELING DOWN, DEPRESSED OR HOPELESS: 1
SUM OF ALL RESPONSES TO PHQ QUESTIONS 1-9: 2

## 2021-04-27 NOTE — PROGRESS NOTES
Willy Abrams is a 80 y.o. female who presents for   Chief Complaint   Patient presents with    Hypertension     check up, no recent labs    Hyperlipidemia     as above    Hypothyroidism     unsure if taking her meds    Immunizations     has not had covid vaccine- wants to do    and follow up of chronic medical problems. Patient Active Problem List   Diagnosis    HBP (high blood pressure)    Constitutional obesity    Hypothyroidism    Shortness of breath     HPI  Here for follow-up on blood pressure and thyroid and patient's 2 daughters came with her and patient denies any new complaints and daughters complained that patient's memory is failing    Current Outpatient Medications   Medication Sig Dispense Refill    Apoaequorin (PREVAGEN) 10 MG CAPS Take 1 capsule by mouth daily      lisinopril-hydroCHLOROthiazide (PRINZIDE;ZESTORETIC) 20-25 MG per tablet take 1 tablet by mouth once daily 30 tablet 5    escitalopram (LEXAPRO) 10 MG tablet take 1 tablet by mouth once daily 90 tablet 1    Cholecalciferol (VITAMIN D-3) 1000 units CAPS Take 1 tablet by mouth      Calcium Carb-Cholecalciferol (CALCIUM 600+D3 PO) Take 1 tablet by mouth      BIOTIN by Does not apply route.  levothyroxine (SYNTHROID) 25 MCG tablet take 1 tablet by mouth once daily 30 tablet 5     No current facility-administered medications for this visit. Allergies   Allergen Reactions    Codeine        Past Medical History:   Diagnosis Date    HBP (high blood pressure)     Hematuria 4-21-16    Patient had a complete workup in the past    Hyperglycemia        No past surgical history on file.     Family History   Problem Relation Age of Onset    High Blood Pressure Mother     High Blood Pressure Father     Asthma Brother     Cancer Maternal Uncle         colon     ROS   Constitutional:  Negative for fatigue, loss of appetite and unexpected weight change   HEENT            : Negative for neck stiffness and pain, no congestion or sinus pressure   Eyes                : No visual disturbance or pain   Cardiovascular: No chest pain or palpitations or leg swelling   Respiratory      : Negative for cough, shortness of breath or wheezing   Gastrointestinal: Negative for abdominal pain, constipation or diarrhea and bloating No nausea or vomiting   Genitourinary:     No urgency or frequency, no burning or hematuria   Musculoskeletal: No arthralgias, back pain or myalgias   Skin                  : Negative for rash or erythema   Neurological    : Negative for dizziness, weakness, tremors ,light headedness or syncope   Psychiatric       : Negative for dysphoric mood, sleep disturbances, nervous or anxious, or decreased concentration   All other review of systems was negative    Objective  Physical Examination:    Nursing note reviewed    BP (!) 174/80   Pulse 76   Temp 97.4 °F (36.3 °C) (Infrared)   Resp 16   Ht 5' 4\" (1.626 m)   Wt 174 lb 9.6 oz (79.2 kg)   BMI 29.97 kg/m²   BP Readings from Last 3 Encounters:   04/27/21 (!) 174/80   08/25/20 136/74   03/05/20 138/74         Constitutional:  Nelli Arzola is oriented to place, person and time ,appears well-developed and well-nourished  HEENT:  Atraumatic and normocephalic, external ears normal bilaterally, nose normal no oropharyngeal exudate and is clear and moist  Eyes:  EOCM normal; conjunctivae normal; PERRLA bilaterally  Neck:  Normal range of motion, neck supple, no JVD and no thyromegaly  Cardiovascular:  RRR, normal heart sounds and intact distal pulses  Pulmonary:  effort normal and breath sounds normal bilaterally,no wheezes or rales, no respiratory distress  Abdominal:  Soft, non-tender; normal bowel sounds, no masses  Musculoskeletal:  Normal range of motion and no edema or tenderness bilaterally  No lymphadenopathy  Neurological:  alert, oriented, and normal reflexes bilaterally  Skin: warm and dry  Psychiatric:  normal mood and effect; behavior normal.    Labs: Lab Results   Component Value Date    LABA1C 5.6 03/08/2018     Lab Results   Component Value Date    CHOL 140 03/08/2018     Lab Results   Component Value Date    HDL 61 03/08/2018     Lab Results   Component Value Date    LDLCALC 64 03/08/2018     Lab Results   Component Value Date    TRIG 73 03/08/2018     No components found for: [place], [place]  Lab Results   Component Value Date    WBC 7.3 06/10/2019    HGB 14.0 06/10/2019    HCT 41.2 06/10/2019    MCV 92 06/10/2019     06/10/2019     No results found for: INR, PROTIME  Lab Results   Component Value Date    GLUCOSE 92 06/10/2019    CREATININE 0.95 06/10/2019    BUN 14 06/10/2019     06/10/2019    K 4.4 06/10/2019     06/10/2019    CO2 28 06/10/2019     Lab Results   Component Value Date    ALT 14 06/10/2019    AST 21 06/10/2019    ALKPHOS 74 06/10/2019    BILITOT 0.7 06/10/2019     Lab Results   Component Value Date    LABALBU 4.0 06/10/2019     Lab Results   Component Value Date    TSH 3.01 06/10/2019     Assessment:  1. Hypothyroidism, unspecified type    2. Essential hypertension    3. Memory problem    4.  Generalized anxiety disorder        Plan:  Patient's blood pressure is elevated and says that she is taking the lisinopril hydrochlorothiazide but daughters were not sure whether she is taking all her medications or not and advised to continue lisinopril hydrochlorothiazide and add Norvasc 5 mg daily which she was taking in the past and monitor blood pressure  Patient has been taking Lexapro but patient's daughters mentioned that she may not be taking it every day and advised to stay on that daily  Patient not taking her levothyroxine and so will repeat lab work and patient did not do the blood work done as requested last time  Also last time patient was referred to neurology for memory issues and patient did not follow-up with them and they tried to call her but the phone was not answered and so I explained to the daughters about the issue Patient had a CT scan done last in 2019 and had a lung nodule which was improving but advised them to repeat in 1 year and so we will repeat the CT chest to ensure stability  We did an MMSE in the office today again and it shows 20/30 and patient's previous test was 24/30 and the time before it was 27 or 30 and patient never went to see the neurologist but we did refer last time and explained to the daughter's and patient and the need to see the neurologist and referral was made again  Total time in discussion review of reports and examination more than 45 minutes  Review in 2 months           1. Shasta received counseling on the following healthy behaviors: nutrition and exercise    2. Prior labs and health maintenance reviewed. 3.  Discussed use, benefit, and side effects of prescribed medications. Barriers to medication compliance addressed. All her questions were answered. Pt voiced understanding. Debo Shirts will continue current medications, diet and exercise. No orders of the defined types were placed in this encounter. Completed Refills               Requested Prescriptions     Pending Prescriptions Disp Refills    lisinopril-hydroCHLOROthiazide (PRINZIDE;ZESTORETIC) 20-25 MG per tablet 90 tablet 1     Sig: take 1 tablet by mouth once daily    escitalopram (LEXAPRO) 10 MG tablet 90 tablet 1     Sig: take 1 tablet by mouth once daily    levothyroxine (SYNTHROID) 25 MCG tablet 90 tablet 1     Sig: take 1 tablet by mouth once daily     4. Patient given educational materials - see patient instructions    5. Was a self-tracking handout given in paper form or via [employer]? NO    No orders of the defined types were placed in this encounter. No follow-ups on file. Patient voiced understanding and agreed to treatment plan.      Electronically signed by Lidia Gomes MD on 4/27/2021 at 3:02 PM    This note is created with a voice recognition program and while intend to generate a document that accurately reflects the content of the visit, no guarantee can be provided that every mistake has been identified and corrected by editing.

## 2021-04-28 ENCOUNTER — TELEPHONE (OUTPATIENT)
Dept: INTERNAL MEDICINE CLINIC | Age: 86
End: 2021-04-28

## 2021-04-28 NOTE — TELEPHONE ENCOUNTER
[facility] received a call from:    Name of Caller: Lilli Friedman    Relationship to patient: Nurse    Organization name: [address] contact number: [phone]    Reason for call: Lilli Friedman is calling because she needs recent office notes on patient, he's will be going for a CT of chest. Fax number is [phone].  Please advise

## 2021-05-05 ENCOUNTER — TELEPHONE (OUTPATIENT)
Dept: INTERNAL MEDICINE CLINIC | Age: 86
End: 2021-05-05

## 2021-05-05 NOTE — TELEPHONE ENCOUNTER
Received a call from [facility]  Office states when they contacted patient, patient was unaware of needing an appt and did not schedule,I called patient hoping to speak with her daughter but spoke with Henna Lo and ensured her to call to get schedule

## 2021-05-13 ENCOUNTER — TELEPHONE (OUTPATIENT)
Dept: INTERNAL MEDICINE CLINIC | Age: 86
End: 2021-05-13

## 2021-05-13 NOTE — TELEPHONE ENCOUNTER
Pt called in to request another referral for a \"CT Chest Wo Contrast\"  as she never went to the previous appointment and cancelled it.

## 2021-07-30 ENCOUNTER — TELEPHONE (OUTPATIENT)
Dept: INTERNAL MEDICINE CLINIC | Age: 86
End: 2021-07-30

## 2021-07-30 NOTE — TELEPHONE ENCOUNTER
Patient had a fall in her home and hit her head patient states she seems to be doing ok  Patient is still admitted to [facility] will get reports upon discharge

## 2021-07-30 NOTE — TELEPHONE ENCOUNTER
----- Message from Luis Robertoaleksey sent at 7/29/2021  5:26 PM EDT -----  Subject: Message to Provider    QUESTIONS  Information for Provider? patient is in the [facility] wants her   doctor to either come see her or call her   ---------------------------------------------------------------------------  --------------  [address]  What is the best way for the office to contact you? OK to leave message on   voicemail  Preferred Call Back Phone Number? [phone]  ---------------------------------------------------------------------------  --------------  SCRIPT ANSWERS  Relationship to Patient? Other  Representative Name? mecca  Is the Representative on the appropriate HIPAA document in Epic?  Yes

## 2021-08-02 ENCOUNTER — TELEPHONE (OUTPATIENT)
Dept: INTERNAL MEDICINE CLINIC | Age: 86
End: 2021-08-02

## 2021-08-02 NOTE — TELEPHONE ENCOUNTER
Son Petra Esparza called concerned about mom's memory over the last 2-3 months. states she is unable to stand on her own following the fall 7/28/21. Reports pt declined home care. Petra Esparza is not on HIPPA, was advised office will reach out to pt directly.

## 2021-08-05 ENCOUNTER — CARE COORDINATION (OUTPATIENT)
Dept: CARE COORDINATION | Age: 86
End: 2021-08-05

## 2021-08-05 ENCOUNTER — TELEPHONE (OUTPATIENT)
Dept: INTERNAL MEDICINE CLINIC | Age: 86
End: 2021-08-05

## 2021-08-05 DIAGNOSIS — I10 ESSENTIAL HYPERTENSION: ICD-10-CM

## 2021-08-05 DIAGNOSIS — E03.9 HYPOTHYROIDISM, UNSPECIFIED TYPE: ICD-10-CM

## 2021-08-05 DIAGNOSIS — R41.3 MEMORY PROBLEM: ICD-10-CM

## 2021-08-05 DIAGNOSIS — I62.9 INTRACRANIAL HEMORRHAGE (HCC): Primary | ICD-10-CM

## 2021-08-05 DIAGNOSIS — R42 VERTIGO: ICD-10-CM
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
## 2021-08-05 RX ORDER — LEVETIRACETAM 500 MG/1
1 TABLET ORAL EVERY 12 HOURS
COMMUNITY
Start: 2021-07-31 | End: 2021-08-23

## 2021-08-05 NOTE — PROGRESS NOTES
Orders for home care and wheeled walker.        Future Appointments   Date Time Provider Jade Keller   8/12/2021  2:00 PM John Walsh MD [facility]

## 2021-08-05 NOTE — LETTER
8/5/2021    Harman Rosenbaum  [address] [address]      Dear Harman Rosenbaum,    My name is Hank Bradley RN and I am a registered nurse who partners with Margie Adan MD to improve patients' health. Margie Adan MD believes you would benefit from working with me. As a member of your health care team, I would work with other providers involved in your care, offer education for your specific health conditions, and connect you with additional resources as needed. I will collaborate with Margie Adan MD to support you in following your treatment plan. The additional support I provide is no additional cost to you. My primary focus is to help you achieve specific goals and improve your health. We are committed to walk with you on this journey and look forward to working with you. Please call me to further discuss your healthcare needs. I am available by phone or for appointments at the office. You can reach me at [phone].     In good health,     Hank Bradley RN

## 2021-08-05 NOTE — CARE COORDINATION
John Meza 1626 IP Discharge Follow up Call    Date of discharge: [facility] 7/31/2021- s/p fall intracranial hemorrhage. Facility: [facility] 7/28/2021-7/31/2021  Non-face-to-face services provided:  Scheduled appointment with PCP-has appointment 8/12/2021  Scheduled appointment with Specialist-encouraged to call neuro  Obtained and reviewed discharge summary and/or continuity of care documents  Reviewed and followed up on pending diagnostic tests and treatments-done  Education of patient/family/caregiver/guardian to support self-management-done  Assessment and support for treatment adherence and medication management-done  Assistance in accessing community resources-referral to [facility], meals delivered. Reason for Hospital Visit:  Fall  Discharged with [facility] [place]?:  No    Date of first visit after discharge:  8/12/2021  Status:     improved    Did you receive a discharge summary with list of medication from the hospital? Yes  Review of Instructions:     Understands what to report/when to return?:  Yes   Understands discharge instructions?:  No   Following discharge instructions?:  Yes   If not why? Is there any lingering symptoms? Yes dizziness  Are you eating and drinking OK? Yes  Any other problems i.e. Constipation, other symptoms? No  Are there any new complaints of pain? No  If you have a wound is the dressing clean, dry, and intact? No  Understands wound care regimen? N/A  Are there any other complaints/concerns that you wish to tell your provider?      Challenges to be reviewed by the provider   Additional needs identified to be addressed with provider Yes  [facility] appts/Provider:    Future Appointments   Date Time Provider Jade Keller   8/12/2021  2:00 PM [facility]       New Medications at discharge?:     Juni Osborn                   Medication Reconciliation by phone -    Yes    Each medication was reviewed (both pre and post hospitalization)  Yes    Were there discrepancies in medications? Yes  If YES, were discrepancies addressed? Yes    Understands Medications? No   Questions about medications from pt or caregiver? Yes   Questions addressed? Yes                Has all of medication and/or prescriptions   Yes  Taking Medications? Yes  Can you swallow your pills? Yes    Is the patient having any trouble with ADL's or IADL's? Yes -dizziness  Is the patient able to move around as expected? No: -hx of fall, dizziness  Needs Equipment? Yes  - walker, states someone called her and she told them she needed one. Link to services in community?:  No   Which services:  Referral to [facility]    Educated patient about risk for severe COVID-19 due to risk factors according to CDC guidelines. ACM reviewed discharge instructions, medical action plan and red flag symptoms with the patient who verbalized understanding. Discussed COVID vaccination status: Yes. Education provided on COVID-19 vaccination as appropriate. Discussed exposure protocols and quarantine with CDC Guidelines. Patient was given an opportunity to verbalize any questions and concerns and agrees to contact ACM or health care provider for questions related to their healthcare. Advance Care Planning  People with COVID-19 may have no symptoms, mild symptoms, such as fever, cough, and shortness of breath or they may have more severe illness, developing severe and fatal pneumonia. As a result, Advance Care Planning with attention to naming a health care decision maker (someone you trust to make healthcare decisions for you if you could not speak for yourself) and sharing other health care preferences is important BEFORE a possible health crisis. Please contact your Primary Care Provider to discuss Advance Care Planning.     Preventing the Spread of Coronavirus Disease 2019 in Homes and Residential Communities  For the most recent information go to [email or web address]    Prevention steps for People with confirmed or suspected COVID-19 (including persons under investigation) who do not need to be hospitalized  and   People with confirmed COVID-19 who were hospitalized and determined to be medically stable to go home    Your healthcare provider and public health staff will evaluate whether you can be cared for at home. If it is determined that you do not need to be hospitalized and can be isolated at home, you will be monitored by staff from your local or state health department. You should follow the prevention steps below until a healthcare provider or local or state health department says you can return to your normal activities. Stay home except to get medical care  People who are mildly ill with COVID-19 are able to isolate at home during their illness. You should restrict activities outside your home, except for getting medical care. Do not go to work, school, or public areas. Avoid using public transportation, ride-sharing, or taxis. Separate yourself from other people and animals in your home  People: As much as possible, you should stay in a specific room and away from other people in your home. Also, you should use a separate bathroom, if available. Animals: You should restrict contact with pets and other animals while you are sick with COVID-19, just like you would around other people. Although there have not been reports of pets or other animals becoming sick with COVID-19, it is still recommended that people sick with COVID-19 limit contact with animals until more information is known about the virus. When possible, have another member of your household care for your animals while you are sick. If you are sick with COVID-19, avoid contact with your pet, including petting, snuggling, being kissed or licked, and sharing food.  If you must care for your pet or be around animals while you are sick, wash your hands before and after you interact with pets and wear a facemask. Call ahead before visiting your doctor  If you have a medical appointment, call the healthcare provider and tell them that you have or may have COVID-19. This will help the healthcare providers office take steps to keep other people from getting infected or exposed. Wear a facemask  You should wear a facemask when you are around other people (e.g., sharing a room or vehicle) or pets and before you enter a healthcare providers office. If you are not able to wear a facemask (for example, because it causes trouble breathing), then people who live with you should not stay in the same room with you, or they should wear a facemask if they enter your room. Cover your coughs and sneezes  Cover your mouth and nose with a tissue when you cough or sneeze. Throw used tissues in a lined trash can. Immediately wash your hands with soap and water for at least 20 seconds or, if soap and water are not available, clean your hands with an alcohol-based hand  that contains at least 60% alcohol. Clean your hands often  Wash your hands often with soap and water for at least 20 seconds, especially after blowing your nose, coughing, or sneezing; going to the bathroom; and before eating or preparing food. If soap and water are not readily available, use an alcohol-based hand  with at least 60% alcohol, covering all surfaces of your hands and rubbing them together until they feel dry. Soap and water are the best option if hands are visibly dirty. Avoid touching your eyes, nose, and mouth with unwashed hands. Avoid sharing personal household items  You should not share dishes, drinking glasses, cups, eating utensils, towels, or bedding with other people or pets in your home. After using these items, they should be washed thoroughly with soap and water.   Clean all high-touch surfaces everyday  High touch surfaces include counters, tabletops, doorknobs, bathroom fixtures, toilets, phones, keyboards, tablets, and bedside tables. Also, clean any surfaces that may have blood, stool, or body fluids on them. Use a household cleaning spray or wipe, according to the label instructions. Labels contain instructions for safe and effective use of the cleaning product including precautions you should take when applying the product, such as wearing gloves and making sure you have good ventilation during use of the product. Monitor your symptoms  Seek prompt medical attention if your illness is worsening (e.g., difficulty breathing). Before seeking care, call your healthcare provider and tell them that you have, or are being evaluated for, COVID-19. Put on a facemask before you enter the facility. These steps will help the healthcare providers office to keep other people in the office or waiting room from getting infected or exposed. Ask your healthcare provider to call the local or state health department. Persons who are placed under active monitoring or facilitated self-monitoring should follow instructions provided by their local health department or occupational health professionals, as appropriate. When working with your local health department check their available hours. If you have a medical emergency and need to call 911, notify the dispatch personnel that you have, or are being evaluated for COVID-19. If possible, put on a facemask before emergency medical services arrive. Discontinuing home isolation  Patients with confirmed COVID-19 should remain under home isolation precautions until the risk of secondary transmission to others is thought to be low. The decision to discontinue home isolation precautions should be made on a case-by-case basis, in consultation with healthcare providers and state and local health departments. [facility] concerns for safety at home since pt lives alone and has memory issues.    Spoke to patient who states she is doing well. She gets \"woozie\" when getting up. She was feeling little worse yesterday so she went [facility]. Patient has f/u appt on 8/12/2021. Patient is agreeable to home care, which she declined at discharge. CM offered choices and she wants [facility]. She would like a walker, CM will request order. CM will add to care panel and follow up to verify services.        Adrianna Hall RN  Nurse Care Coordinator

## 2021-08-05 NOTE — CARE COORDINATION
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 Pt's on Ludwin Mcbride who lives in [place] called the office with concerns that no one is helping pt. Pt d/c [facility] on 7/31/2021 with Intracranial hemorrhage s/p fall. She declined SNF, HHC and a walker. Pt went to [facility] ED yesterday with c/o dizziness. She was assessed and d/c home. Spoke to pt, introduced self and informed of reason for call, pt is receptive to [employer] calls. Patient is single, lives alone in [place], has a car but has not been driving, uses [employer] service. She has 3 children, 2 dtrs that live local and a son Ludwin Mcbride that lives in [place]. Patient had a referral to Neuro for Memory issues 4/27/2021, has had 2 office appts and states her dtr called and cancelled appt because she states she was doing fine and didn't want to go. States she will probably need a new referral since she didn't go to appts. MMSE in office on 4/27 was 20/30 and previous was 24/30. Pt also, had order for CT Chest for f/u  lung nodule on CT 2019 and has not had done. Reviewed and reconciled home meds with pt and provided education, will need to reinforce education on future calls. CM referral to [facility]  for nursing and PT/OT, aide service and  for resources. Pt states she has a Living Will/POA for medical and thinks her  has retired. CM Plan:  - will collaborate with Dr. Nuria Rivera, add pt to CM panel, send [facility] letter. - request order for referral to [facility] and order  for a wheeled walker.   - follow up with patient next week if not sooner.    - reinforce need for Neuro appt, CT chest, COVID vaccines. General Assessment    Do you have any symptoms that are causing concern?: Yes  Progression since Onset: Unchanged  Reported Symptoms: Other (Comment: dizziness)       Educated patient about risk for severe COVID-19 due to risk factors according to CDC guidelines. ACM reviewed discharge instructions, medical action plan and red flag symptoms with the patient who verbalized understanding. Discussed COVID vaccination status: Yes. Education provided on COVID-19 vaccination as appropriate. Discussed exposure protocols and quarantine with CDC Guidelines. Patient was given an opportunity to verbalize any questions and concerns and agrees to contact ACM or health care provider for questions related to their healthcare. Advance Care Planning  People with COVID-19 may have no symptoms, mild symptoms, such as fever, cough, and shortness of breath or they may have more severe illness, developing severe and fatal pneumonia. As a result, Advance Care Planning with attention to naming a health care decision maker (someone you trust to make healthcare decisions for you if you could not speak for yourself) and sharing other health care preferences is important BEFORE a possible health crisis. Please contact your Primary Care Provider to discuss Advance Care Planning. Preventing the Spread of Coronavirus Disease 2019 in Homes and Residential Communities  For the most recent information go to [email or web address]    Prevention steps for People with confirmed or suspected COVID-19 (including persons under investigation) who do not need to be hospitalized  and   People with confirmed COVID-19 who were hospitalized and determined to be medically stable to go home    Your healthcare provider and public health staff will evaluate whether you can be cared for at home.  If it is determined that you do not need to be hospitalized and can be isolated at home, you will are not able to wear a facemask (for example, because it causes trouble breathing), then people who live with you should not stay in the same room with you, or they should wear a facemask if they enter your room. Cover your coughs and sneezes  Cover your mouth and nose with a tissue when you cough or sneeze. Throw used tissues in a lined trash can. Immediately wash your hands with soap and water for at least 20 seconds or, if soap and water are not available, clean your hands with an alcohol-based hand  that contains at least 60% alcohol. Clean your hands often  Wash your hands often with soap and water for at least 20 seconds, especially after blowing your nose, coughing, or sneezing; going to the bathroom; and before eating or preparing food. If soap and water are not readily available, use an alcohol-based hand  with at least 60% alcohol, covering all surfaces of your hands and rubbing them together until they feel dry. Soap and water are the best option if hands are visibly dirty. Avoid touching your eyes, nose, and mouth with unwashed hands. Avoid sharing personal household items  You should not share dishes, drinking glasses, cups, eating utensils, towels, or bedding with other people or pets in your home. After using these items, they should be washed thoroughly with soap and water. Clean all high-touch surfaces everyday  High touch surfaces include counters, tabletops, doorknobs, bathroom fixtures, toilets, phones, keyboards, tablets, and bedside tables. Also, clean any surfaces that may have blood, stool, or body fluids on them. Use a household cleaning spray or wipe, according to the label instructions. Labels contain instructions for safe and effective use of the cleaning product including precautions you should take when applying the product, such as wearing gloves and making sure you have good ventilation during use of the product.   Monitor your symptoms  Seek prompt medical attention if your illness is worsening (e.g., difficulty breathing). Before seeking care, call your healthcare provider and tell them that you have, or are being evaluated for, COVID-19. Put on a facemask before you enter the facility. These steps will help the healthcare providers office to keep other people in the office or waiting room from getting infected or exposed. Ask your healthcare provider to call the local or state health department. Persons who are placed under active monitoring or facilitated self-monitoring should follow instructions provided by their local health department or occupational health professionals, as appropriate. When working with your local health department check their available hours. If you have a medical emergency and need to call 911, notify the dispatch personnel that you have, or are being evaluated for COVID-19. If possible, put on a facemask before emergency medical services arrive. Discontinuing home isolation  Patients with confirmed COVID-19 should remain under home isolation precautions until the risk of secondary transmission to others is thought to be low. The decision to discontinue home isolation precautions should be made on a case-by-case basis, in consultation with healthcare providers and [facility] health departments. Ambulatory Care Coordination Assessment    Care Coordination Protocol  Program Enrollment: Complex Care  Referral from Primary Care Provider: No  Week 1 - Initial Assessment     Do you have all of your prescriptions and are they filled?: Yes  Barriers to medication adherence: None  Are you able to afford your medications?: Yes  How often do you have trouble taking your medications the way you have been told to take them?: I always take them as prescribed.      Do you have Home O2 Therapy?: No      Ability to seek help/take action for Emergent Urgent situations i.e. fire, crime, inclement weather or health crisis.: Needs

## 2021-08-05 NOTE — TELEPHONE ENCOUNTER
Patient son called in yesterday wanting to know if  can assist with patient getting home care services states patient is home alone most of the time she is experiencing  some confusion, memory loss, weakness and can barely stand. Spoke with Dr Gladys Zapien and he is asking if [facility] our care coordinator can assist with home care and ect.     Spoke with [facility] and she will assist.    Spoke with son and explained the need for either a POA or a communication form to be signed for future communication as son is not on HIPPA and suggest an office evaluation  with patient and emergency contact/reletive (appt set for 8/12/21)

## 2021-08-06 ENCOUNTER — TELEPHONE (OUTPATIENT)
Dept: INTERNAL MEDICINE CLINIC | Age: 86
End: 2021-08-06

## 2021-08-06 NOTE — TELEPHONE ENCOUNTER
Patient's grand daughter is calling to request a call from PCP about possible referral for [facility] due to consistent falls. Please reach out to patient and Grand daughter to advise.

## 2021-08-06 NOTE — PROGRESS NOTES
[facility] [place] [address]  [facility] faxed to [phone]

## 2021-08-09 NOTE — TELEPHONE ENCOUNTER
I think this is already taken care of by Jorje Brooks on 8/5/2021 please look into that and if not send a new referral

## 2021-08-10 ENCOUNTER — CARE COORDINATION (OUTPATIENT)
Dept: CARE COORDINATION | Age: 86
End: 2021-08-10

## 2021-08-11 ENCOUNTER — CARE COORDINATION (OUTPATIENT)
Dept: CARE COORDINATION | Age: 86
End: 2021-08-11

## 2021-08-11 SDOH — ECONOMIC STABILITY: HOUSING INSECURITY: IN THE LAST 12 MONTHS, HOW MANY PLACES HAVE YOU LIVED?: 1

## 2021-08-11 SDOH — ECONOMIC STABILITY: FOOD INSECURITY: WITHIN THE PAST 12 MONTHS, YOU WORRIED THAT YOUR FOOD WOULD RUN OUT BEFORE YOU GOT MONEY TO BUY MORE.: NEVER TRUE

## 2021-08-11 SDOH — HEALTH STABILITY: PHYSICAL HEALTH: ON AVERAGE, HOW MANY DAYS PER WEEK DO YOU ENGAGE IN MODERATE TO STRENUOUS EXERCISE (LIKE A BRISK WALK)?: 0 DAYS

## 2021-08-11 SDOH — ECONOMIC STABILITY: TRANSPORTATION INSECURITY
IN THE PAST 12 MONTHS, HAS LACK OF TRANSPORTATION KEPT YOU FROM MEETINGS, WORK, OR FROM GETTING THINGS NEEDED FOR DAILY LIVING?: NO

## 2021-08-11 SDOH — HEALTH STABILITY: PHYSICAL HEALTH: ON AVERAGE, HOW MANY MINUTES DO YOU ENGAGE IN EXERCISE AT THIS LEVEL?: 0 MIN

## 2021-08-11 SDOH — ECONOMIC STABILITY: TRANSPORTATION INSECURITY
IN THE PAST 12 MONTHS, HAS THE LACK OF TRANSPORTATION KEPT YOU FROM MEDICAL APPOINTMENTS OR FROM GETTING MEDICATIONS?: NO

## 2021-08-11 SDOH — ECONOMIC STABILITY: INCOME INSECURITY: IN THE LAST 12 MONTHS, WAS THERE A TIME WHEN YOU WERE NOT ABLE TO PAY THE MORTGAGE OR RENT ON TIME?: NO

## 2021-08-11 SDOH — ECONOMIC STABILITY: HOUSING INSECURITY
IN THE LAST 12 MONTHS, WAS THERE A TIME WHEN YOU DID NOT HAVE A STEADY PLACE TO SLEEP OR SLEPT IN A SHELTER (INCLUDING NOW)?: NO

## 2021-08-11 SDOH — ECONOMIC STABILITY: FOOD INSECURITY: WITHIN THE PAST 12 MONTHS, THE FOOD YOU BOUGHT JUST DIDN'T LAST AND YOU DIDN'T HAVE MONEY TO GET MORE.: NEVER TRUE

## 2021-08-11 ASSESSMENT — SOCIAL DETERMINANTS OF HEALTH (SDOH)
IN A TYPICAL WEEK, HOW MANY TIMES DO YOU TALK ON THE PHONE WITH FAMILY, FRIENDS, OR NEIGHBORS?: MORE THAN THREE TIMES A WEEK
HOW OFTEN DO YOU ATTEND CHURCH OR RELIGIOUS SERVICES?: NEVER
DO YOU BELONG TO ANY CLUBS OR ORGANIZATIONS SUCH AS CHURCH GROUPS UNIONS, FRATERNAL OR ATHLETIC GROUPS, OR SCHOOL GROUPS?: NO
DO YOU BELONG TO ANY CLUBS OR ORGANIZATIONS SUCH AS CHURCH GROUPS UNIONS, FRATERNAL OR ATHLETIC GROUPS, OR SCHOOL GROUPS?: NO
HOW OFTEN DO YOU GET TOGETHER WITH FRIENDS OR RELATIVES?: THREE TIMES A WEEK
HOW OFTEN DO YOU ATTENT MEETINGS OF THE CLUB OR ORGANIZATION YOU BELONG TO?: NEVER
IN A TYPICAL WEEK, HOW MANY TIMES DO YOU TALK ON THE PHONE WITH FAMILY, FRIENDS, OR NEIGHBORS?: MORE THAN THREE TIMES A WEEK
HOW HARD IS IT FOR YOU TO PAY FOR THE VERY BASICS LIKE FOOD, HOUSING, MEDICAL CARE, AND HEATING?: NOT VERY HARD
HOW OFTEN DO YOU ATTEND CHURCH OR RELIGIOUS SERVICES?: NEVER
HOW OFTEN DO YOU ATTENT MEETINGS OF THE CLUB OR ORGANIZATION YOU BELONG TO?: NEVER
HOW OFTEN DO YOU GET TOGETHER WITH FRIENDS OR RELATIVES?: THREE TIMES A WEEK

## 2021-08-11 ASSESSMENT — LIFESTYLE VARIABLES
HOW OFTEN DO YOU HAVE A DRINK CONTAINING ALCOHOL: NEVER
HOW MANY STANDARD DRINKS CONTAINING ALCOHOL DO YOU HAVE ON A TYPICAL DAY: 1 OR 2

## 2021-08-11 NOTE — CARE COORDINATION
Ambulatory Care Coordination Note  8/11/2021  CM Risk Score: 1  Charlson 10 Year Mortality Risk Score: 47%     ACC: Mark Dumont RN    Summary Note: spoke to pt who states she is feeling just fine, has a good appetite and has no pain. States when she gets up she gets dizzy and has been that way a long time. Patient states she has a brain bleed. She denies having a recent fall. Patient received the walker and states she is now using it. Patient states she has PCP appt tomorrow and her grand dtr is bringing her. Patient's grand dtr brings her food. She thinks her visiting nurse is coming today and a dietician came yesterday. patient denies having any current needs or concerns and states she has all her home meds. Encouraged pt to call this nurse or the office with concerns or symptoms of concern. States she has the number for both. CM will follow up with patient in a week. General Assessment    Do you have any symptoms that are causing concern?: No         Care Coordination Interventions    Program Enrollment: Complex Care  Referral from Primary Care Provider: No  Suggested Interventions and Community Resources  Fall Risk Prevention: Completed  Home Care Waiver: Completed (Comment: [phone])  Meals on Wheels: Completed  Transportation Support: Completed  Zone Management Tools: Completed       Educated patient about risk for severe COVID-19 due to risk factors according to CDC guidelines. ACM reviewed discharge instructions, medical action plan and red flag symptoms with the patient who verbalized understanding. Discussed COVID vaccination status: Yes. Education provided on COVID-19 vaccination as appropriate. Discussed exposure protocols and quarantine with CDC Guidelines. Patient was given an opportunity to verbalize any questions and concerns and agrees to contact ACM or health care provider for questions related to their healthcare.   Advance Care Planning  People with COVID-19 may have no symptoms, mild symptoms, such as fever, cough, and shortness of breath or they may have more severe illness, developing severe and fatal pneumonia. As a result, Advance Care Planning with attention to naming a health care decision maker (someone you trust to make healthcare decisions for you if you could not speak for yourself) and sharing other health care preferences is important BEFORE a possible health crisis. Please contact your Primary Care Provider to discuss Advance Care Planning. Preventing the Spread of Coronavirus Disease 2019 in Homes and Residential Communities  For the most recent information go to [email or web address]    Prevention steps for People with confirmed or suspected COVID-19 (including persons under investigation) who do not need to be hospitalized  and   People with confirmed COVID-19 who were hospitalized and determined to be medically stable to go home    Your healthcare provider and public health staff will evaluate whether you can be cared for at home. If it is determined that you do not need to be hospitalized and can be isolated at home, you will be monitored by staff from your local or state health department. You should follow the prevention steps below until a healthcare provider or local or state health department says you can return to your normal activities. Stay home except to get medical care  People who are mildly ill with COVID-19 are able to isolate at home during their illness. You should restrict activities outside your home, except for getting medical care. Do not go to work, school, or public areas. Avoid using public transportation, ride-sharing, or taxis. Separate yourself from other people and animals in your home  People: As much as possible, you should stay in a specific room and away from other people in your home. Also, you should use a separate bathroom, if available. Animals:  You should restrict contact with pets and other animals while you are sick with COVID-19, just like you would around other people. Although there have not been reports of pets or other animals becoming sick with COVID-19, it is still recommended that people sick with COVID-19 limit contact with animals until more information is known about the virus. When possible, have another member of your household care for your animals while you are sick. If you are sick with COVID-19, avoid contact with your pet, including petting, snuggling, being kissed or licked, and sharing food. If you must care for your pet or be around animals while you are sick, wash your hands before and after you interact with pets and wear a facemask. Call ahead before visiting your doctor  If you have a medical appointment, call the healthcare provider and tell them that you have or may have COVID-19. This will help the healthcare providers office take steps to keep other people from getting infected or exposed. Wear a facemask  You should wear a facemask when you are around other people (e.g., sharing a room or vehicle) or pets and before you enter a healthcare providers office. If you are not able to wear a facemask (for example, because it causes trouble breathing), then people who live with you should not stay in the same room with you, or they should wear a facemask if they enter your room. Cover your coughs and sneezes  Cover your mouth and nose with a tissue when you cough or sneeze. Throw used tissues in a lined trash can. Immediately wash your hands with soap and water for at least 20 seconds or, if soap and water are not available, clean your hands with an alcohol-based hand  that contains at least 60% alcohol. Clean your hands often  Wash your hands often with soap and water for at least 20 seconds, especially after blowing your nose, coughing, or sneezing; going to the bathroom; and before eating or preparing food.  If soap and water are not readily available, use an alcohol-based hand  with at least 60% alcohol, covering all surfaces of your hands and rubbing them together until they feel dry. Soap and water are the best option if hands are visibly dirty. Avoid touching your eyes, nose, and mouth with unwashed hands. Avoid sharing personal household items  You should not share dishes, drinking glasses, cups, eating utensils, towels, or bedding with other people or pets in your home. After using these items, they should be washed thoroughly with soap and water. Clean all high-touch surfaces everyday  High touch surfaces include counters, tabletops, doorknobs, bathroom fixtures, toilets, phones, keyboards, tablets, and bedside tables. Also, clean any surfaces that may have blood, stool, or body fluids on them. Use a household cleaning spray or wipe, according to the label instructions. Labels contain instructions for safe and effective use of the cleaning product including precautions you should take when applying the product, such as wearing gloves and making sure you have good ventilation during use of the product. Monitor your symptoms  Seek prompt medical attention if your illness is worsening (e.g., difficulty breathing). Before seeking care, call your healthcare provider and tell them that you have, or are being evaluated for, COVID-19. Put on a facemask before you enter the facility. These steps will help the healthcare providers office to keep other people in the office or waiting room from getting infected or exposed. Ask your healthcare provider to call the local or state health department. Persons who are placed under active monitoring or facilitated self-monitoring should follow instructions provided by their local health department or occupational health professionals, as appropriate. When working with your local health department check their available hours.   If you have a medical emergency and need to call 911, notify the dispatch personnel that you have, or are being evaluated for COVID-19. If possible, put on a facemask before emergency medical services arrive. Discontinuing home isolation  Patients with confirmed COVID-19 should remain under home isolation precautions until the risk of secondary transmission to others is thought to be low. The decision to discontinue home isolation precautions should be made on a case-by-case basis, in consultation with healthcare providers and state and local health departments. Goals Addressed                 This Visit's Progress     Conditions and Symptoms   On track     I will schedule office visits, as directed by my provider. I will keep my appointment or reschedule if I have to cancel. I will notify my provider of any barriers to my plan of care. I will follow my Zone Management tool to seek urgent or emergent care. I will notify my provider of any symptoms that indicate a worsening of my condition. Barriers: impairment:  cognitive, lack of support, and lack of education  Plan for overcoming my barriers: will work with [facility]  Confidence: 7/10  Anticipated Goal Completion Date: 12/1/2021              Prior to Admission medications    Medication Sig Start Date End Date Taking? Authorizing Provider   levETIRAcetam (KEPPRA) 500 MG tablet Take 1 tablet by mouth every 12 hours 7/31/21   Historical Provider, MD   Misc.  Devices (ROLLER [employer]) MISC 1 each by Does not apply route continuous 8/5/21   Tammie Salcedo MD   lisinopril-hydroCHLOROthiazide (PRINZIDE;ZESTORETIC) 20-25 MG per tablet take 1 tablet by mouth once daily 4/27/21   Tammie Salcedo MD   escitalopram (LEXAPRO) 10 MG tablet take 1 tablet by mouth once daily  Patient not taking: Reported on 8/5/2021 4/27/21   Tammie Salcedo MD   levothyroxine (SYNTHROID) 25 MCG tablet take 1 tablet by mouth once daily 4/27/21   Tammie Salcedo MD   amLODIPine (NORVASC) 5 MG tablet Take 1 tablet by mouth daily  Patient not taking: Reported on 8/5/2021 4/27/21   Inna Abraham MD   Apoaequorin (PREVAGEN) 10 MG CAPS Take 1 capsule by mouth daily  Patient not taking: Reported on 8/5/2021    Historical Provider, MD   Cholecalciferol (VITAMIN D-3) 1000 units CAPS Take 1 tablet by mouth  Patient not taking: Reported on 8/5/2021    Historical Provider, MD   Calcium Carb-Cholecalciferol (CALCIUM 600+D3 PO) Take 1 tablet by mouth  Patient not taking: Reported on 8/5/2021    Historical Provider, MD   BIOTIN by Does not apply route.   Patient not taking: Reported on 8/5/2021    Historical Provider, MD       Future Appointments   Date Time Provider Jade Arianna   8/12/2021  2:00 PM Inna Abraham MD [facility] Claudette Nani

## 2021-08-17 ENCOUNTER — CARE COORDINATION (OUTPATIENT)
Dept: CARE COORDINATION | Age: 86
End: 2021-08-17

## 2021-08-17 NOTE — CARE COORDINATION
Ambulatory Care Coordination Note  8/17/2021  CM Risk Score: 1  Charlson 10 Year Mortality Risk Score: 47%     ACC: Padmaja Whitman RN    Summary Note: outreach call ans spoke to patient who she feels a lttle \"woozy\" when she stands up, she is using the walker her granddaughter got her. She does feel more comfortable using her walker. Discussed the safety and precautions for Fall Risk behaviors. Patient states she has appointment with Neuro 8/19/21,  Her granddaughter is planning to take. States her grandson is there doing some cleaning. Patient states the visiting nurse came and checked her BP was good. She said the speech therapist is coming today but she hasn't has PT yet. Patient states she currently has everything she needs and denies currently having concerns. Radha Crabtree states she had her 1st COVID vaccine and she forgot to go get her 2nd one and she really wants to get it. Patient states she went on 5/24/21 ans was supposed to go back on 6/21/21. Patient states she has office appt on 8/23/21 and she can go then to get her shot. Encouraged patient to call this nurse with symptoms or concerns, clarified nurses contact number. CM Plan to follow up with patient in a week to follow up on Neuro visit and call to dtr in regards to CT chest .    General Assessment    Do you have any symptoms that are causing concern?: No       Care Coordination Interventions    Program Enrollment: Complex Care  Referral from Primary Care Provider: No  Suggested Interventions and Community Resources  Fall Risk Prevention: Completed  Home Health Services: Completed (Comment: [address])  Meals on Wheels: Completed  Transportation Support: Completed  Zone Management Tools: Completed       Educated patient about risk for severe COVID-19 due to risk factors according to CDC guidelines. ACM reviewed discharge instructions, medical action plan and red flag symptoms with the patient who verbalized understanding.  Discussed COVID vaccination status: Yes. Education provided on COVID-19 vaccination as appropriate. Discussed exposure protocols and quarantine with CDC Guidelines. Patient was given an opportunity to verbalize any questions and concerns and agrees to contact ACM or health care provider for questions related to their healthcare. Advance Care Planning  People with COVID-19 may have no symptoms, mild symptoms, such as fever, cough, and shortness of breath or they may have more severe illness, developing severe and fatal pneumonia. As a result, Advance Care Planning with attention to naming a health care decision maker (someone you trust to make healthcare decisions for you if you could not speak for yourself) and sharing other health care preferences is important BEFORE a possible health crisis. Please contact your Primary Care Provider to discuss Advance Care Planning. Preventing the Spread of Coronavirus Disease 2019 in Homes and Residential Communities  For the most recent information go to [email or web address]    Prevention steps for People with confirmed or suspected COVID-19 (including persons under investigation) who do not need to be hospitalized  and   People with confirmed COVID-19 who were hospitalized and determined to be medically stable to go home    Your healthcare provider and public health staff will evaluate whether you can be cared for at home. If it is determined that you do not need to be hospitalized and can be isolated at home, you will be monitored by staff from your local or state health department. You should follow the prevention steps below until a healthcare provider or local or state health department says you can return to your normal activities. Stay home except to get medical care  People who are mildly ill with COVID-19 are able to isolate at home during their illness.  You should restrict activities outside your home, except for getting medical care. Do not go to work, school, or public areas. Avoid using public transportation, ride-sharing, or taxis. Separate yourself from other people and animals in your home  People: As much as possible, you should stay in a specific room and away from other people in your home. Also, you should use a separate bathroom, if available. Animals: You should restrict contact with pets and other animals while you are sick with COVID-19, just like you would around other people. Although there have not been reports of pets or other animals becoming sick with COVID-19, it is still recommended that people sick with COVID-19 limit contact with animals until more information is known about the virus. When possible, have another member of your household care for your animals while you are sick. If you are sick with COVID-19, avoid contact with your pet, including petting, snuggling, being kissed or licked, and sharing food. If you must care for your pet or be around animals while you are sick, wash your hands before and after you interact with pets and wear a facemask. Call ahead before visiting your doctor  If you have a medical appointment, call the healthcare provider and tell them that you have or may have COVID-19. This will help the healthcare providers office take steps to keep other people from getting infected or exposed. Wear a facemask  You should wear a facemask when you are around other people (e.g., sharing a room or vehicle) or pets and before you enter a healthcare providers office. If you are not able to wear a facemask (for example, because it causes trouble breathing), then people who live with you should not stay in the same room with you, or they should wear a facemask if they enter your room. Cover your coughs and sneezes  Cover your mouth and nose with a tissue when you cough or sneeze. Throw used tissues in a lined trash can.  Immediately wash your hands with soap and water for at least 20 seconds or, if soap and water are not available, clean your hands with an alcohol-based hand  that contains at least 60% alcohol. Clean your hands often  Wash your hands often with soap and water for at least 20 seconds, especially after blowing your nose, coughing, or sneezing; going to the bathroom; and before eating or preparing food. If soap and water are not readily available, use an alcohol-based hand  with at least 60% alcohol, covering all surfaces of your hands and rubbing them together until they feel dry. Soap and water are the best option if hands are visibly dirty. Avoid touching your eyes, nose, and mouth with unwashed hands. Avoid sharing personal household items  You should not share dishes, drinking glasses, cups, eating utensils, towels, or bedding with other people or pets in your home. After using these items, they should be washed thoroughly with soap and water. Clean all high-touch surfaces everyday  High touch surfaces include counters, tabletops, doorknobs, bathroom fixtures, toilets, phones, keyboards, tablets, and bedside tables. Also, clean any surfaces that may have blood, stool, or body fluids on them. Use a household cleaning spray or wipe, according to the label instructions. Labels contain instructions for safe and effective use of the cleaning product including precautions you should take when applying the product, such as wearing gloves and making sure you have good ventilation during use of the product. Monitor your symptoms  Seek prompt medical attention if your illness is worsening (e.g., difficulty breathing). Before seeking care, call your healthcare provider and tell them that you have, or are being evaluated for, COVID-19. Put on a facemask before you enter the facility. These steps will help the healthcare providers office to keep other people in the office or waiting room from getting infected or exposed.  Ask your healthcare provider to call the mouth once daily 4/27/21   Nick Hall MD   escitalopram (LEXAPRO) 10 MG tablet take 1 tablet by mouth once daily  Patient not taking: Reported on 8/5/2021 4/27/21   Nick Hall MD   levothyroxine (SYNTHROID) 25 MCG tablet take 1 tablet by mouth once daily 4/27/21   Nick Hall MD   amLODIPine (NORVASC) 5 MG tablet Take 1 tablet by mouth daily  Patient not taking: Reported on 8/5/2021 4/27/21   Nick Hall MD   Apoaequorin (PREVAGEN) 10 MG CAPS Take 1 capsule by mouth daily  Patient not taking: Reported on 8/5/2021    Historical Provider, MD   Cholecalciferol (VITAMIN D-3) 1000 units CAPS Take 1 tablet by mouth  Patient not taking: Reported on 8/5/2021    Historical Provider, MD   Calcium Carb-Cholecalciferol (CALCIUM 600+D3 PO) Take 1 tablet by mouth  Patient not taking: Reported on 8/5/2021    Historical Provider, MD   BIOTIN by Does not apply route.   Patient not taking: Reported on 8/5/2021    Historical Provider, MD       Future Appointments   Date Time Provider Jade Keller   8/23/2021  3:30 PM [facility]

## 2021-08-23 ENCOUNTER — VIRTUAL VISIT (OUTPATIENT)
Dept: INTERNAL MEDICINE CLINIC | Age: 86
End: 2021-08-23
Payer: COMMERCIAL

## 2021-08-23 DIAGNOSIS — E03.9 HYPOTHYROIDISM, UNSPECIFIED TYPE: ICD-10-CM

## 2021-08-23 DIAGNOSIS — S06.5XAA SUBDURAL HEMATOMA: ICD-10-CM

## 2021-08-23 DIAGNOSIS — I10 ESSENTIAL HYPERTENSION: ICD-10-CM

## 2021-08-23 DIAGNOSIS — I60.9 SUBARACHNOID HEMORRHAGE (HCC): Primary | ICD-10-CM

## 2021-08-23 PROCEDURE — 99443 PR PHYS/QHP TELEPHONE EVALUATION 21-30 MIN: CPT | Performed by: INTERNAL MEDICINE

## 2021-08-23 PROCEDURE — 1111F DSCHRG MED/CURRENT MED MERGE: CPT | Performed by: INTERNAL MEDICINE

## 2021-08-23 RX ORDER — BLOOD PRESSURE TEST KIT
KIT MISCELLANEOUS
Qty: 1 KIT | Refills: 0 | Status: SHIPPED | OUTPATIENT
Start: 2021-08-23 | End: 2021-08-23 | Stop reason: SDUPTHER

## 2021-08-23 RX ORDER — BLOOD PRESSURE TEST KIT
KIT MISCELLANEOUS
Qty: 1 KIT | Refills: 0 | Status: SHIPPED | OUTPATIENT
Start: 2021-08-23 | End: 2022-04-04

## 2021-08-23 NOTE — PROGRESS NOTES
Post-Discharge Transitional Care Management Services or Hospital Follow Up      Mikala Johnston   YOB: 1935    Date of Office Visit:  8/23/2021  Date of Hospital Admission:  7/28/2021  Date of Hospital Discharge:  7/31/1921  Risk of hospital readmission (high >=14%. Medium >=10%) :No data recorded    Care management risk score Rising risk (score 2-5) and Complex Care (Scores >=6): 1     Non face to face  following discharge, date last encounter closed (first attempt may have been earlier): *No documented post hospital discharge outreach found in the last 14 days    Call initiated 2 business days of discharge: *No response recorded in the last 14 days    Patient Active Problem List   Diagnosis    HBP (high blood pressure)    Constitutional obesity    Hypothyroidism    Shortness of breath       Allergies   Allergen Reactions    Codeine        Medications listed as ordered at the time of discharge from [address] Medication Instructions ADIS:    Printed on:08/23/21 5010   Medication Information                      levothyroxine (SYNTHROID) 25 MCG tablet  take 1 tablet by mouth once daily             lisinopril-hydroCHLOROthiazide (PRINZIDE;ZESTORETIC) 20-25 MG per tablet  take 1 tablet by mouth once daily             Misc. Devices (Cassandra Joselito) MISC  1 each by Does not apply route continuous                   Medications marked \"taking\" at this time  Outpatient Medications Marked as Taking for the 8/23/21 encounter (Virtual Visit) with Carlos Faust MD   Medication Sig [facility] [facility].  Devices (ROLLER WALKER) MISC 1 each by Does not apply route continuous 1 each 0    lisinopril-hydroCHLOROthiazide (PRINZIDE;ZESTORETIC) 20-25 MG per tablet take 1 tablet by mouth once daily 90 tablet 1    levothyroxine (SYNTHROID) 25 MCG tablet take 1 tablet by mouth once daily 90 tablet 1        Medications patient taking as of now reconciled against medications ordered at time of hospital discharge: Yes    Chief Complaint   Patient presents with   [address] from Hospital     home since 7/31/21    Memory Loss     feels memory loss worsening, pt does not want to leave her home, family thinking about life alert. Not doing much cooking herself, mobile meals brings 1 meal, family asisting with meals, cleaning       History of Present illness - Follow up of Hospital diagnosis(es): Apparently patient had a fall and subsequently developed left leg weakness and patient concerned that she may be developing a stroke and called 911 and patient was evaluated and taken to the emergency room and patient had an CT scan done which showed a subarachnoid and subdural hematoma and patient was evaluated by neuro interventional and patient was started on Keppra for 4 days and patient's subsequent functionality improved and patient started physical therapy and advised to go to SNF but patient refused and came home and currently doing well denies any complaints    Inpatient course: Discharge summary reviewed- see chart. Interval history/Current status: Patient's medications reviewed and patient is only on lisinopril and Synthroid other medications were discontinued and patient was started on Keppra for 4 days only and patient is doing well and she says her speech good and patient is on the phone very well oriented and communicative and knows what she is talking and what she wants to do and granddaughter is next to her and I advised patient to monitor her blood pressure and they said that her blood pressure was 154/86 when she went to the neurologist office and advised her to monitor and call me back in 2 weeks and also call me back if she does not hear anything from the physical therapy    A comprehensive review of systems was negative except for what was noted in the HPI. There were no vitals filed for this visit. There is no height or weight on file to calculate BMI.    Wt Readings from

## 2021-08-23 NOTE — PROGRESS NOTES
Discussed  with grand daughter- Blood pressure cuff reordered sent to [facility]    Call about life alert and if needs order from us let us know

## 2021-10-07 ENCOUNTER — CARE COORDINATION (OUTPATIENT)
Dept: CARE COORDINATION | Age: 86
End: 2021-10-07

## 2021-10-07 NOTE — LETTER
10/11/2021    Daniella Bolanos  [address]      Daniella Bolanos     Congratulations on the progress you have made improving and taking charge of your health! Your recent follow up with Carmen Grajeda MD finds you doing well and are no longer in need of Care Coordination services. I know you will continue to use the knowledge and tools you have gained to continue down a healthy path. As you have demonstrated that you are able to successfully manage your health and wellness, I will no longer contact you on a regular basis. Again, congratulations and please know if there are any changes or you have a need for my services in the future, you may always contact me for questions or concerns.   In good health,       Brennon Ferreira RN

## 2021-10-07 NOTE — CARE COORDINATION
Ambulatory Care Coordination Note  10/7/2021  CM Risk Score: 1  Charlson 10 Year Mortality Risk Score: 47%     ACC: Clemencia Doran, LENO    Summary Note: spoke to patient who states she is doing good. States she was in the hospital few weeks ago and someone told her she had a stroke. She denies having any symptoms since. States a home nurse came out to see her a man and he took her binoculars and then woman nurse came out and she told her about it. States she told the nurse she doesn't need anyone to come and noone has been back. States she doesn't need or want anyone in her house. She denies having a recent fall and continues to use her walker for ambulation. states she has all her home meds and states compliance. Patient asked for office appt and was scheduled on 10/14/21. States her grand daughter will bring her. Encouraged pt to call with symptoms or concerns, states she doesn't need calls to check on her and she has the office number and this nurse's number and will call if she needs to. CM will update Dr Alfonzo Schafer and recommend graduation. Care Coordination Interventions    Program Enrollment: Complex Care  Referral from Primary Care Provider: No  Suggested Interventions and Community Resources  Fall Risk Prevention: Completed  Home Health Services: Completed (Comment: [address])  Meals on Wheels: Completed  Transportation Support: Completed  Zone Management Tools: Completed       General Assessment         Educated patient about risk for severe COVID-19 due to risk factors according to CDC guidelines. [facility] reviewed discharge instructions, medical action plan and red flag symptoms with the patient who verbalized understanding. Discussed COVID vaccination status: Yes. Education provided on COVID-19 vaccination as appropriate. Discussed exposure protocols and quarantine with CDC Guidelines.  Patient was given an opportunity to verbalize any questions and concerns and agrees to contact AC or health care provider for questions related to their healthcare. Advance Care Planning  People with COVID-19 may have no symptoms, mild symptoms, such as fever, cough, and shortness of breath or they may have more severe illness, developing severe and fatal pneumonia. As a result, Advance Care Planning with attention to naming a health care decision maker (someone you trust to make healthcare decisions for you if you could not speak for yourself) and sharing other health care preferences is important BEFORE a possible health crisis. Please contact your Primary Care Provider to discuss Advance Care Planning. Preventing the Spread of Coronavirus Disease 2019 in Homes and Residential Communities  For the most recent information go to [email or web address]    Prevention steps for People with confirmed or suspected COVID-19 (including persons under investigation) who do not need to be hospitalized  and   People with confirmed COVID-19 who were hospitalized and determined to be medically stable to go home    Your healthcare provider and public health staff will evaluate whether you can be cared for at home. If it is determined that you do not need to be hospitalized and can be isolated at home, you will be monitored by staff from your local or state health department. You should follow the prevention steps below until a healthcare provider or local or state health department says you can return to your normal activities. Stay home except to get medical care  People who are mildly ill with COVID-19 are able to isolate at home during their illness. You should restrict activities outside your home, except for getting medical care. Do not go to work, school, or public areas. Avoid using public transportation, ride-sharing, or taxis.   Separate yourself from other people and animals in your home  People: As much as possible, you should stay in a specific room and away from other people in your home. Also, you should use a separate bathroom, if available. Animals: You should restrict contact with pets and other animals while you are sick with COVID-19, just like you would around other people. Although there have not been reports of pets or other animals becoming sick with COVID-19, it is still recommended that people sick with COVID-19 limit contact with animals until more information is known about the virus. When possible, have another member of your household care for your animals while you are sick. If you are sick with COVID-19, avoid contact with your pet, including petting, snuggling, being kissed or licked, and sharing food. If you must care for your pet or be around animals while you are sick, wash your hands before and after you interact with pets and wear a facemask. Call ahead before visiting your doctor  If you have a medical appointment, call the healthcare provider and tell them that you have or may have COVID-19. This will help the healthcare providers office take steps to keep other people from getting infected or exposed. Wear a facemask  You should wear a facemask when you are around other people (e.g., sharing a room or vehicle) or pets and before you enter a healthcare providers office. If you are not able to wear a facemask (for example, because it causes trouble breathing), then people who live with you should not stay in the same room with you, or they should wear a facemask if they enter your room. Cover your coughs and sneezes  Cover your mouth and nose with a tissue when you cough or sneeze. Throw used tissues in a lined trash can. Immediately wash your hands with soap and water for at least 20 seconds or, if soap and water are not available, clean your hands with an alcohol-based hand  that contains at least 60% alcohol.   Clean your hands often  Wash your hands often with soap and water for at least 20 seconds, especially after blowing your nose, coughing, or sneezing; going to the bathroom; and before eating or preparing food. If soap and water are not readily available, use an alcohol-based hand  with at least 60% alcohol, covering all surfaces of your hands and rubbing them together until they feel dry. Soap and water are the best option if hands are visibly dirty. Avoid touching your eyes, nose, and mouth with unwashed hands. Avoid sharing personal household items  You should not share dishes, drinking glasses, cups, eating utensils, towels, or bedding with other people or pets in your home. After using these items, they should be washed thoroughly with soap and water. Clean all high-touch surfaces everyday  High touch surfaces include counters, tabletops, doorknobs, bathroom fixtures, toilets, phones, keyboards, tablets, and bedside tables. Also, clean any surfaces that may have blood, stool, or body fluids on them. Use a household cleaning spray or wipe, according to the label instructions. Labels contain instructions for safe and effective use of the cleaning product including precautions you should take when applying the product, such as wearing gloves and making sure you have good ventilation during use of the product. Monitor your symptoms  Seek prompt medical attention if your illness is worsening (e.g., difficulty breathing). Before seeking care, call your healthcare provider and tell them that you have, or are being evaluated for, COVID-19. Put on a facemask before you enter the facility. These steps will help the healthcare providers office to keep other people in the office or waiting room from getting infected or exposed. Ask your healthcare provider to call the local or [facility] health department. Persons who are placed under active monitoring or facilitated self-monitoring should follow instructions provided by their local health department or occupational health professionals, as appropriate.  When working with your local health department check their available hours. If you have a medical emergency and need to call 911, notify the dispatch personnel that you have, or are being evaluated for COVID-19. If possible, put on a facemask before emergency medical services arrive. Discontinuing home isolation  Patients with confirmed COVID-19 should remain under home isolation precautions until the risk of secondary transmission to others is thought to be low. The decision to discontinue home isolation precautions should be made on a case-by-case basis, in consultation with healthcare providers and [facility] and local health departments. Goals Addressed    None         Prior to Admission medications    Medication Sig Start Date End Date Taking? Authorizing Provider   Blood Pressure KIT Use as directed 8/23/21   Jasmyn Chaves MD   Misc. Devices (ROLLER [place]) MISC 1 each by Does not apply route continuous 8/5/21   Jasmyn Chaves MD   lisinopril-hydroCHLOROthiazide (PRINZIDE;ZESTORETIC) 20-25 MG per tablet take 1 tablet by mouth once daily 4/27/21   Jasmyn Chaves MD   levothyroxine (SYNTHROID) 25 MCG tablet take 1 tablet by mouth once daily 4/27/21   Jasmyn Chaves MD       No future appointments.

## 2021-10-13 ENCOUNTER — CARE COORDINATION (OUTPATIENT)
Dept: CARE COORDINATION | Age: 86
End: 2021-10-13

## 2021-10-18 ENCOUNTER — OFFICE VISIT (OUTPATIENT)
Dept: INTERNAL MEDICINE CLINIC | Age: 86
End: 2021-10-18
Payer: MEDICARE

## 2021-10-18 VITALS
SYSTOLIC BLOOD PRESSURE: 130 MMHG | HEART RATE: 77 BPM | BODY MASS INDEX: 26.29 KG/M2 | TEMPERATURE: 97.2 F | OXYGEN SATURATION: 99 % | WEIGHT: 154 LBS | HEIGHT: 64 IN | DIASTOLIC BLOOD PRESSURE: 86 MMHG | RESPIRATION RATE: 20 BRPM

## 2021-10-18 DIAGNOSIS — E03.8 OTHER SPECIFIED HYPOTHYROIDISM: ICD-10-CM

## 2021-10-18 DIAGNOSIS — R63.4 WEIGHT LOSS: Primary | ICD-10-CM

## 2021-10-18 DIAGNOSIS — I10 ESSENTIAL HYPERTENSION: ICD-10-CM

## 2021-10-18 DIAGNOSIS — Z91.81 AT HIGH RISK FOR FALLS: ICD-10-CM

## 2021-10-18 PROCEDURE — 1090F PRES/ABSN URINE INCON ASSESS: CPT | Performed by: INTERNAL MEDICINE

## 2021-10-18 PROCEDURE — 99214 OFFICE O/P EST MOD 30 MIN: CPT | Performed by: INTERNAL MEDICINE

## 2021-10-18 PROCEDURE — 4040F PNEUMOC VAC/ADMIN/RCVD: CPT | Performed by: INTERNAL MEDICINE

## 2021-10-18 PROCEDURE — G8484 FLU IMMUNIZE NO ADMIN: HCPCS | Performed by: INTERNAL MEDICINE

## 2021-10-18 PROCEDURE — G8427 DOCREV CUR MEDS BY ELIG CLIN: HCPCS | Performed by: INTERNAL MEDICINE

## 2021-10-18 PROCEDURE — 1036F TOBACCO NON-USER: CPT | Performed by: INTERNAL MEDICINE

## 2021-10-18 PROCEDURE — G8417 CALC BMI ABV UP PARAM F/U: HCPCS | Performed by: INTERNAL MEDICINE

## 2021-10-18 PROCEDURE — 1123F ACP DISCUSS/DSCN MKR DOCD: CPT | Performed by: INTERNAL MEDICINE

## 2021-10-18 RX ORDER — LISINOPRIL AND HYDROCHLOROTHIAZIDE 25; 20 MG/1; MG/1
TABLET ORAL
Qty: 90 TABLET | Refills: 1 | Status: SHIPPED | OUTPATIENT
Start: 2021-10-18 | End: 2022-10-12 | Stop reason: SDUPTHER

## 2021-10-18 RX ORDER — LEVOTHYROXINE SODIUM 0.03 MG/1
TABLET ORAL
Qty: 90 TABLET | Refills: 1 | Status: SHIPPED | OUTPATIENT
Start: 2021-10-18 | End: 2022-10-12 | Stop reason: SDUPTHER

## 2021-10-18 ASSESSMENT — PATIENT HEALTH QUESTIONNAIRE - PHQ9
SUM OF ALL RESPONSES TO PHQ QUESTIONS 1-9: 0
SUM OF ALL RESPONSES TO PHQ QUESTIONS 1-9: 0
2. FEELING DOWN, DEPRESSED OR HOPELESS: 0
SUM OF ALL RESPONSES TO PHQ QUESTIONS 1-9: 0
1. LITTLE INTEREST OR PLEASURE IN DOING THINGS: 0
SUM OF ALL RESPONSES TO PHQ9 QUESTIONS 1 & 2: 0

## 2021-10-18 NOTE — PROGRESS NOTES
Jeovanny Sheehan is a 80 y.o. female who presents for   Chief Complaint   Patient presents with    3 Month Follow-Up     concerned about weight loss; got one of the covid vaccines forgot to get the 2nd    Health Maintenance     tdap, shingles, awv, covid, flu    Memory Loss     having some memory concerns    and follow up of chronic medical problems. Patient Active Problem List   Diagnosis    HBP (high blood pressure)    Constitutional obesity    Hypothyroidism    Shortness of breath     HPI  Here for evaluation of weight loss going on for a while denies any symptoms other than that and feeling well    Current Outpatient Medications   Medication Sig Dispense Refill    lisinopril-hydroCHLOROthiazide (PRINZIDE;ZESTORETIC) 20-25 MG per tablet take 1 tablet by mouth once daily 90 tablet 1    levothyroxine (SYNTHROID) 25 MCG tablet take 1 tablet by mouth once daily 90 tablet 1    Blood Pressure KIT Use as directed 1 kit 0    Misc. Devices (ROLLER [place]) MISC 1 each by Does not apply route continuous (Patient not taking: Reported on 10/18/2021) 1 each 0     No current facility-administered medications for this visit. Allergies   Allergen Reactions    Codeine        Past Medical History:   Diagnosis Date    HBP (high blood pressure)     Hematuria 4-21-16    Patient had a complete workup in the past    Hyperglycemia        No past surgical history on file.     Family History   Problem Relation Age of Onset    High Blood Pressure Mother     High Blood Pressure Father     Asthma Brother     Cancer Maternal Uncle         colon     ROS   Constitutional:  Negative for fatigue, loss of appetite but positive for unexpected weight change   HEENT            : Negative for neck stiffness and pain, no congestion or sinus pressure   Eyes                : No visual disturbance or pain   Cardiovascular: No chest pain or palpitations or leg swelling   Respiratory      : Negative for cough, shortness of breath or wheezing   Gastrointestinal: Negative for abdominal pain, constipation or diarrhea and bloating No nausea or vomiting   Genitourinary:     No urgency or frequency, no burning or hematuria   Musculoskeletal: No arthralgias, back pain or myalgias   Skin                  : Negative for rash or erythema   Neurological    : Negative for dizziness, weakness, tremors ,light headedness or syncope   Psychiatric       : Negative for dysphoric mood, sleep disturbances, nervous or anxious, or decreased concentration   All other review of systems was negative    Objective  Physical Examination:    Nursing note reviewed    /86 (Site: Right Upper Arm, Position: Sitting, Cuff Size: Medium Adult)   Pulse 77   Temp 97.2 °F (36.2 °C) (Temporal)   Resp 20   Ht 5' 4.02\" (1.626 m)   Wt 154 lb (69.9 kg)   SpO2 99%   Breastfeeding No   BMI 26.42 kg/m²   BP Readings from Last 3 Encounters:   10/18/21 130/86   04/27/21 (!) 180/90   08/25/20 136/74         Constitutional:  Sb Hernandez is oriented to place, person and time ,appears well-developed and well-nourished  HEENT:  Atraumatic and normocephalic, external ears normal bilaterally, nose normal no oropharyngeal exudate and is clear and moist  Eyes:  EOCM normal; conjunctivae normal; PERRLA bilaterally  Neck:  Normal range of motion, neck supple, no JVD and no thyromegaly  Cardiovascular:  RRR, normal heart sounds and intact distal pulses  Pulmonary:  effort normal and breath sounds normal bilaterally,no wheezes or rales, no respiratory distress  Abdominal:  Soft, non-tender; normal bowel sounds, no masses  Musculoskeletal:  Normal range of motion and no edema or tenderness bilaterally  No lymphadenopathy  Neurological:  alert, oriented, and normal reflexes bilaterally  Skin: warm and dry  Psychiatric:  normal mood and effect; behavior normal.    Labs:   Lab Results   Component Value Date    LABA1C 5.6 03/08/2018     Lab Results   Component Value Date    CHOL 140 03/08/2018     Lab Results   Component Value Date    HDL 61 03/08/2018     Lab Results   Component Value Date    LDLCALC 64 03/08/2018     Lab Results   Component Value Date    TRIG 73 03/08/2018     No components found for: [place], [place]  Lab Results   Component Value Date    WBC 7.3 06/10/2019    HGB 14.0 06/10/2019    HCT 41.2 06/10/2019    MCV 92 06/10/2019     06/10/2019     No results found for: INR, PROTIME  Lab Results   Component Value Date    GLUCOSE 92 06/10/2019    CREATININE 0.95 06/10/2019    BUN 14 06/10/2019     06/10/2019    K 4.4 06/10/2019     06/10/2019    CO2 28 06/10/2019     Lab Results   Component Value Date    ALT 14 06/10/2019    AST 21 06/10/2019    ALKPHOS 74 06/10/2019    BILITOT 0.7 06/10/2019     Lab Results   Component Value Date    LABALBU 4.0 06/10/2019     Lab Results   Component Value Date    TSH 3.01 06/10/2019     Assessment:  1. Weight loss    2. Essential hypertension    3. Other specified hypothyroidism    4. At high risk for falls        Plan:  Patient has lost 20 pounds since April and says she is eating same and doing well and does not have any symptoms and I advised patient to see a gastroenterologist and referral made for evaluation for weight loss  Also order lab work to check for her thyroid as patient is not really compliant with her medications and also ordered other labs pertaining to weight loss  Also patient advised to get a CT of the chest abdomen and pelvis to evaluate for weight loss  Blood pressure is stable on current medications  Continue current dose of Synthroid at 25 mcg daily  Review in 2 months           1. [place] received counseling on the following healthy behaviors: nutrition and exercise    2. Prior labs and health maintenance reviewed. 3.  Discussed use, benefit, and side effects of prescribed medications. Barriers to medication compliance addressed. All her questions were answered. Pt voiced understanding.    Yoav forbes continue current medications, diet and exercise. Orders Placed This Encounter   Medications    lisinopril-hydroCHLOROthiazide (PRINZIDE;ZESTORETIC) 20-25 MG per tablet     Sig: take 1 tablet by mouth once daily     Dispense:  90 tablet     Refill:  1    levothyroxine (SYNTHROID) 25 MCG tablet     Sig: take 1 tablet by mouth once daily     Dispense:  90 tablet     Refill:  1          Completed Refills               Requested Prescriptions     Signed Prescriptions Disp Refills    lisinopril-hydroCHLOROthiazide (PRINZIDE;ZESTORETIC) 20-25 MG per tablet 90 tablet 1     Sig: take 1 tablet by mouth once daily    levothyroxine (SYNTHROID) 25 MCG tablet 90 tablet 1     Sig: take 1 tablet by mouth once daily     4. Patient given educational materials - see patient instructions    5. Was a self-tracking handout given in paper form or via [employer]lshart? NO    Orders Placed This Encounter   Procedures    CT CHEST ABDOMEN PELVIS W WO CONTRAST     Standing Status:   Future     Standing Expiration Date:   10/18/2022     Order Specific Question:   Additional Contrast?     Answer:   Oral     Order Specific Question:   Reason for exam:     Answer:   Weight loss    Urinalysis     Standing Status:   Future     Standing Expiration Date:   10/18/2022     Order Specific Question:   SPECIFY(EX-CATH,MIDSTREAM,CYSTO,ETC)?      Answer:   midstream    CBC     Standing Status:   Future     Standing Expiration Date:   10/18/2022    Magnesium     Standing Status:   Future     Standing Expiration Date:   10/18/2022    Vitamin B12     Standing Status:   Future     Standing Expiration Date:   10/18/2022    TSH without Reflex     Standing Status:   Future     Standing Expiration Date:   10/18/2022    T4, Free     Standing Status:   Future     Standing Expiration Date:   10/18/2022    Lipid Panel     Standing Status:   Future     Standing Expiration Date:   10/18/2022     Order Specific Question:   Is Patient Fasting?/# of Hours Answer:   15    Sedimentation rate, manual     Standing Status:   Future     Standing Expiration Date:   10/18/2022    CK     Standing Status:   Future     Standing Expiration Date:   10/18/2022    Lipase     Standing Status:   Future     Standing Expiration Date:   10/18/2022    Amylase     Standing Status:   Future     Standing Expiration Date:   10/18/2022   Yessi Acevedo MD, Gastroenterology, [address]     Referral Priority:   Routine     Referral Type:   Eval and Treat     Referral Reason:   Specialty Services Required     Referred to Provider:   Mindi Cortes MD     Requested Specialty:   Gastroenterology     Number of Visits Requested:   1     Return in about 2 months (around 12/18/2021). Patient voiced understanding and agreed to treatment plan. Electronically signed by Yuliet Cho MD on 10/18/2021 at 1:13 PM    This note is created with a voice recognition program and while intend to generate a document that accurately reflects the content of the visit, no guarantee can be provided that every mistake has been identified and corrected by editing. On the basis of positive falls risk screening, assessment and plan is as follows: home safety tips provided.

## 2021-10-22 RX ORDER — ESCITALOPRAM OXALATE 10 MG/1
TABLET ORAL
Qty: 90 TABLET | Refills: 1 | OUTPATIENT
Start: 2021-10-22

## 2021-11-05 ENCOUNTER — TELEPHONE (OUTPATIENT)
Dept: FAMILY MEDICINE CLINIC | Age: 86
End: 2021-11-05

## 2021-12-06 ENCOUNTER — OFFICE VISIT (OUTPATIENT)
Dept: INTERNAL MEDICINE CLINIC | Age: 86
End: 2021-12-06
Payer: MEDICARE

## 2021-12-06 VITALS
HEART RATE: 67 BPM | DIASTOLIC BLOOD PRESSURE: 100 MMHG | TEMPERATURE: 97.2 F | OXYGEN SATURATION: 94 % | SYSTOLIC BLOOD PRESSURE: 172 MMHG | BODY MASS INDEX: 25.13 KG/M2 | HEIGHT: 64 IN | RESPIRATION RATE: 18 BRPM | WEIGHT: 147.2 LBS

## 2021-12-06 DIAGNOSIS — E03.9 HYPOTHYROIDISM, UNSPECIFIED TYPE: ICD-10-CM

## 2021-12-06 DIAGNOSIS — I10 ESSENTIAL HYPERTENSION: Primary | ICD-10-CM

## 2021-12-06 DIAGNOSIS — R63.4 WEIGHT LOSS: ICD-10-CM

## 2021-12-06 PROCEDURE — G8427 DOCREV CUR MEDS BY ELIG CLIN: HCPCS | Performed by: INTERNAL MEDICINE

## 2021-12-06 PROCEDURE — 99214 OFFICE O/P EST MOD 30 MIN: CPT | Performed by: INTERNAL MEDICINE

## 2021-12-06 PROCEDURE — 1090F PRES/ABSN URINE INCON ASSESS: CPT | Performed by: INTERNAL MEDICINE

## 2021-12-06 PROCEDURE — 1036F TOBACCO NON-USER: CPT | Performed by: INTERNAL MEDICINE

## 2021-12-06 PROCEDURE — G8484 FLU IMMUNIZE NO ADMIN: HCPCS | Performed by: INTERNAL MEDICINE

## 2021-12-06 PROCEDURE — G8417 CALC BMI ABV UP PARAM F/U: HCPCS | Performed by: INTERNAL MEDICINE

## 2021-12-06 PROCEDURE — 4040F PNEUMOC VAC/ADMIN/RCVD: CPT | Performed by: INTERNAL MEDICINE

## 2021-12-06 PROCEDURE — 1123F ACP DISCUSS/DSCN MKR DOCD: CPT | Performed by: INTERNAL MEDICINE

## 2021-12-06 RX ORDER — AMLODIPINE BESYLATE 5 MG/1
5 TABLET ORAL DAILY
Qty: 30 TABLET | Refills: 2 | Status: SHIPPED | OUTPATIENT
Start: 2021-12-06 | End: 2022-10-12 | Stop reason: ALTCHOICE

## 2021-12-06 ASSESSMENT — PATIENT HEALTH QUESTIONNAIRE - PHQ9
SUM OF ALL RESPONSES TO PHQ QUESTIONS 1-9: 0
SUM OF ALL RESPONSES TO PHQ9 QUESTIONS 1 & 2: 0
2. FEELING DOWN, DEPRESSED OR HOPELESS: 0
SUM OF ALL RESPONSES TO PHQ QUESTIONS 1-9: 0
SUM OF ALL RESPONSES TO PHQ QUESTIONS 1-9: 0
1. LITTLE INTEREST OR PLEASURE IN DOING THINGS: 0

## 2021-12-06 NOTE — PROGRESS NOTES
or diarrhea and bloating No nausea or vomiting   Genitourinary:     No urgency or frequency, no burning or hematuria   Musculoskeletal: No arthralgias, back pain or myalgias   Skin                  : Negative for rash or erythema   Neurological    : Negative for dizziness, weakness, tremors ,light headedness or syncope   Psychiatric       : Negative for dysphoric mood, sleep disturbances, nervous or anxious, or decreased concentration   All other review of systems was negative    Objective  Physical Examination:    Nursing note reviewed    BP (!) 172/100 (Site: Left Upper Arm, Position: Sitting, Cuff Size: Medium Adult)   Pulse 67   Temp 97.2 °F (36.2 °C) (Temporal)   Resp 18   Ht 5' 4.02\" (1.626 m)   Wt 147 lb 3.2 oz (66.8 kg)   SpO2 94%   Breastfeeding No   BMI 25.25 kg/m²   BP Readings from Last 3 Encounters:   12/06/21 (!) 172/100   10/18/21 130/86   04/27/21 (!) 180/90         Constitutional:  Estuardo Hanter is oriented to place, person and time ,appears well-developed and well-nourished  HEENT:  Atraumatic and normocephalic, external ears normal bilaterally, nose normal no oropharyngeal exudate and is clear and moist  Eyes:  EOCM normal; conjunctivae normal; PERRLA bilaterally  Neck:  Normal range of motion, neck supple, no JVD and no thyromegaly  Cardiovascular:  RRR, normal heart sounds and intact distal pulses  Pulmonary:  effort normal and breath sounds normal bilaterally,no wheezes or rales, no respiratory distress  Abdominal:  Soft, non-tender; normal bowel sounds, no masses  Musculoskeletal:  Normal range of motion and no edema or tenderness bilaterally  No lymphadenopathy  Neurological:  alert, oriented, and normal reflexes bilaterally  Skin: warm and dry  Psychiatric:  normal mood and effect; behavior normal.    Labs:   Lab Results   Component Value Date    LABA1C 5.6 03/08/2018     Lab Results   Component Value Date    CHOL 140 03/08/2018     Lab Results   Component Value Date    HDL 61 Prior labs and health maintenance reviewed. 3.  Discussed use, benefit, and side effects of prescribed medications. Barriers to medication compliance addressed. All her questions were answered. Pt voiced understanding. Suma Cohen will continue current medications, diet and exercise. Orders Placed This Encounter   Medications    amLODIPine (NORVASC) 5 MG tablet     Sig: Take 1 tablet by mouth daily     Dispense:  30 tablet     Refill:  2          Completed Refills               Requested Prescriptions     Signed Prescriptions Disp Refills    amLODIPine (NORVASC) 5 MG tablet 30 tablet 2     Sig: Take 1 tablet by mouth daily     4. Patient given educational materials - see patient instructions    5. Was a self-tracking handout given in paper form or via [employer]? NO    No orders of the defined types were placed in this encounter. No follow-ups on file. Patient voiced understanding and agreed to treatment plan. Electronically signed by Minor Snellen, MD on 12/6/2021 at 2:35 PM    This note is created with a voice recognition program and while intend to generate a document that accurately reflects the content of the visit, no guarantee can be provided that every mistake has been identified and corrected by editing.

## 2021-12-07 ENCOUNTER — TELEPHONE (OUTPATIENT)
Dept: GASTROENTEROLOGY | Age: 86
End: 2021-12-07

## 2021-12-07 NOTE — TELEPHONE ENCOUNTER
Patient refused [place] location and only wanted [facility]. Appointment made for 1/28/22 1:45 pm.  Writer thanked and call ended.

## 2021-12-14 ENCOUNTER — HOSPITAL ENCOUNTER (OUTPATIENT)
Dept: CT IMAGING | Age: 86
Discharge: HOME | End: 2021-12-16
Payer: MEDICARE

## 2021-12-14 DIAGNOSIS — R63.4 WEIGHT LOSS: ICD-10-CM

## 2021-12-14 LAB
CREAT SERPL-MCNC: 0.87 MG/DL (ref 0.5–0.9)
GFR AFRICAN AMERICAN: >60 ML/MIN
GFR NON-AFRICAN AMERICAN: >60 ML/MIN
GFR SERPL CREATININE-BSD FRML MDRD: NORMAL ML/MIN/{1.73_M2}
GFR SERPL CREATININE-BSD FRML MDRD: NORMAL ML/MIN/{1.73_M2}

## 2021-12-14 PROCEDURE — 74177 CT ABD & PELVIS W/CONTRAST: CPT

## 2021-12-14 PROCEDURE — 2580000003 HC RX 258: Performed by: INTERNAL MEDICINE

## 2021-12-14 PROCEDURE — 82565 ASSAY OF CREATININE: CPT

## 2021-12-14 PROCEDURE — 36415 COLL VENOUS BLD VENIPUNCTURE: CPT

## 2021-12-14 PROCEDURE — 6360000004 HC RX CONTRAST MEDICATION: Performed by: INTERNAL MEDICINE

## 2021-12-14 RX ORDER — SODIUM CHLORIDE 0.9 % (FLUSH) 0.9 %
10 SYRINGE (ML) INJECTION PRN
Status: DISCONTINUED | OUTPATIENT
Start: 2021-12-14 | End: 2021-12-17 | Stop reason: HOSPADM

## 2021-12-14 RX ORDER — 0.9 % SODIUM CHLORIDE 0.9 %
80 INTRAVENOUS SOLUTION INTRAVENOUS ONCE
Status: COMPLETED | OUTPATIENT
Start: 2021-12-14 | End: 2021-12-14

## 2021-12-14 RX ADMIN — IOHEXOL 30 ML: 300 INJECTION, SOLUTION INTRAVENOUS at 17:06

## 2021-12-14 RX ADMIN — IOPAMIDOL 75 ML: 755 INJECTION, SOLUTION INTRAVENOUS at 17:05

## 2021-12-14 RX ADMIN — SODIUM CHLORIDE, PRESERVATIVE FREE 10 ML: 5 INJECTION INTRAVENOUS at 17:06

## 2021-12-14 RX ADMIN — SODIUM CHLORIDE 80 ML: 9 INJECTION, SOLUTION INTRAVENOUS at 17:06

## 2022-02-01 NOTE — TELEPHONE ENCOUNTER
Called pt to reschedule no show appt, phone just rang and rang. Sent back to PCP as patient refusal due to 2 NP appointment no shows.

## 2022-04-04 ENCOUNTER — OFFICE VISIT (OUTPATIENT)
Dept: INTERNAL MEDICINE CLINIC | Age: 87
End: 2022-04-04
Payer: MEDICARE

## 2022-04-04 VITALS
RESPIRATION RATE: 18 BRPM | BODY MASS INDEX: 26.43 KG/M2 | TEMPERATURE: 97 F | OXYGEN SATURATION: 98 % | HEIGHT: 64 IN | HEART RATE: 68 BPM | WEIGHT: 154.8 LBS | DIASTOLIC BLOOD PRESSURE: 84 MMHG | SYSTOLIC BLOOD PRESSURE: 130 MMHG

## 2022-04-04 DIAGNOSIS — E78.00 HIGH CHOLESTEROL: ICD-10-CM

## 2022-04-04 DIAGNOSIS — I10 ESSENTIAL HYPERTENSION: Primary | ICD-10-CM

## 2022-04-04 DIAGNOSIS — E03.9 HYPOTHYROIDISM, UNSPECIFIED TYPE: ICD-10-CM

## 2022-04-04 PROCEDURE — G8427 DOCREV CUR MEDS BY ELIG CLIN: HCPCS | Performed by: INTERNAL MEDICINE

## 2022-04-04 PROCEDURE — 4040F PNEUMOC VAC/ADMIN/RCVD: CPT | Performed by: INTERNAL MEDICINE

## 2022-04-04 PROCEDURE — 1123F ACP DISCUSS/DSCN MKR DOCD: CPT | Performed by: INTERNAL MEDICINE

## 2022-04-04 PROCEDURE — 1090F PRES/ABSN URINE INCON ASSESS: CPT | Performed by: INTERNAL MEDICINE

## 2022-04-04 PROCEDURE — 99214 OFFICE O/P EST MOD 30 MIN: CPT | Performed by: INTERNAL MEDICINE

## 2022-04-04 PROCEDURE — 1036F TOBACCO NON-USER: CPT | Performed by: INTERNAL MEDICINE

## 2022-04-04 PROCEDURE — G8417 CALC BMI ABV UP PARAM F/U: HCPCS | Performed by: INTERNAL MEDICINE

## 2022-04-04 ASSESSMENT — PATIENT HEALTH QUESTIONNAIRE - PHQ9
1. LITTLE INTEREST OR PLEASURE IN DOING THINGS: 0
SUM OF ALL RESPONSES TO PHQ QUESTIONS 1-9: 0
SUM OF ALL RESPONSES TO PHQ9 QUESTIONS 1 & 2: 0
2. FEELING DOWN, DEPRESSED OR HOPELESS: 0
SUM OF ALL RESPONSES TO PHQ QUESTIONS 1-9: 0

## 2022-04-04 NOTE — PROGRESS NOTES
Shane Zaidi is a 80 y.o. female who presents for   Chief Complaint   Patient presents with    Check-Up     patient states she is able to do things but it seems like she is not the doing    Health Maintenance     covid, tdap, shingles, awv    Memory Loss     patient drove herself to dr flannery today; patient states she us also having some memory issues; patient was unable to find bank card was able to find it in her front purse pocket     and follow up of chronic medical problems. Patient Active Problem List   Diagnosis    HBP (high blood pressure)    Constitutional obesity    Hypothyroidism    Shortness of breath     HPI  Here for follow-up on blood pressure denies any new complaints and feeling better gained weight and she is driving herself to the office and also living by herself without any problem except occasional memory issues forgetting small things    Current Outpatient Medications   Medication Sig Dispense Refill    amLODIPine (NORVASC) 5 MG tablet Take 1 tablet by mouth daily 30 tablet 2    lisinopril-hydroCHLOROthiazide (PRINZIDE;ZESTORETIC) 20-25 MG per tablet take 1 tablet by mouth once daily 90 tablet 1    levothyroxine (SYNTHROID) 25 MCG tablet take 1 tablet by mouth once daily 90 tablet 1     No current facility-administered medications for this visit. Allergies   Allergen Reactions    Codeine        Past Medical History:   Diagnosis Date    HBP (high blood pressure)     Hematuria 4-21-16    Patient had a complete workup in the past    Hyperglycemia        No past surgical history on file.     Family History   Problem Relation Age of Onset    High Blood Pressure Mother     High Blood Pressure Father     Asthma Brother     Cancer Maternal Uncle         colon     ROS   Constitutional:  Negative for fatigue, loss of appetite and unexpected weight change   HEENT            : Negative for neck stiffness and pain, no congestion or sinus pressure   Eyes                : No visual disturbance or pain   Cardiovascular: No chest pain or palpitations or leg swelling   Respiratory      : Negative for cough, shortness of breath or wheezing   Gastrointestinal: Negative for abdominal pain, constipation or diarrhea and bloating No nausea or vomiting   Genitourinary:     No urgency or frequency, no burning or hematuria   Musculoskeletal: No arthralgias, back pain or myalgias   Skin                  : Negative for rash or erythema   Neurological    : Negative for dizziness, weakness, tremors ,light headedness or syncope   Psychiatric       : Negative for dysphoric mood, sleep disturbances, nervous or anxious, or decreased concentration   All other review of systems was negative    Objective  Physical Examination:    Nursing note reviewed    /84   Pulse 68   Temp 97 °F (36.1 °C) (Temporal)   Resp 18   Ht 5' 4.02\" (1.626 m)   Wt 154 lb 12.8 oz (70.2 kg)   SpO2 98%   Breastfeeding No   BMI 26.56 kg/m²   BP Readings from Last 3 Encounters:   04/04/22 130/84   12/06/21 (!) 172/100   10/18/21 130/86         Constitutional:  [place] is oriented to place, person and time ,appears well-developed and well-nourished  HEENT:  Atraumatic and normocephalic, external ears normal bilaterally, nose normal no oropharyngeal exudate and is clear and moist  Eyes:  EOCM normal; conjunctivae normal; PERRLA bilaterally  Neck:  Normal range of motion, neck supple, no JVD and no thyromegaly  Cardiovascular:  RRR, normal heart sounds and intact distal pulses  Pulmonary:  effort normal and breath sounds normal bilaterally,no wheezes or rales, no respiratory distress  Abdominal:  Soft, non-tender; normal bowel sounds, no masses  Musculoskeletal:  Normal range of motion and no edema or tenderness bilaterally  No lymphadenopathy  Neurological:  alert, oriented, and normal reflexes bilaterally  Skin: warm and dry  Psychiatric:  normal mood and effect; behavior normal.    Labs:   Lab Results   Component Value Date    LABA1C 5.6 03/08/2018     Lab Results   Component Value Date    CHOL 140 03/08/2018     Lab Results   Component Value Date    HDL 61 03/08/2018     Lab Results   Component Value Date    LDLCALC 64 03/08/2018     Lab Results   Component Value Date    TRIG 73 03/08/2018     No components found for: [place], [place]  Lab Results   Component Value Date    WBC 7.3 06/10/2019    HGB 14.0 06/10/2019    HCT 41.2 06/10/2019    MCV 92 06/10/2019     06/10/2019     No results found for: INR, PROTIME  Lab Results   Component Value Date    GLUCOSE 92 06/10/2019    CREATININE 0.87 12/14/2021    BUN 14 06/10/2019     06/10/2019    K 4.4 06/10/2019     06/10/2019    CO2 28 06/10/2019     Lab Results   Component Value Date    ALT 14 06/10/2019    AST 21 06/10/2019    ALKPHOS 74 06/10/2019    BILITOT 0.7 06/10/2019     Lab Results   Component Value Date    LABALBU 4.0 06/10/2019     Lab Results   Component Value Date    TSH 3.01 06/10/2019     Assessment:  1. Essential hypertension    2. Hypothyroidism, unspecified type    3. High cholesterol        Plan:  Patient's blood pressure initially elevated but repeat examination shows normal blood pressure and advised her to continue amlodipine 5 mg daily and lisinopril hydrochlorothiazide 20/25 once a day  Patient is on levothyroxine 25 mcg daily and patient did not get the lab work done and new labs ordered  Patient had no recent cholesterol profile done and new labs ordered and last LDL 64 and HDL 61  Patient did not get the work-up done and patient gained about 7 pounds and not interested in any further work-up and feeling good  We did an MMSE in the office to evaluate her memory and she scored 27 out of 30  Review in 6 months           1. Shasta received counseling on the following healthy behaviors: nutrition and exercise    2. Prior labs and health maintenance reviewed. 3.  Discussed use, benefit, and side effects of prescribed medications.   Barriers to medication compliance addressed. All her questions were answered. Pt voiced understanding. Cindy Rush will continue current medications, diet and exercise. No orders of the defined types were placed in this encounter. Completed Refills               Requested Prescriptions      No prescriptions requested or ordered in this encounter     4. Patient given educational materials - see patient instructions    5. Was a self-tracking handout given in paper form or via [employer]? NO    Orders Placed This Encounter   Procedures    Comprehensive Metabolic Panel     Standing Status:   Future     Standing Expiration Date:   4/4/2023    Vitamin B12     Standing Status:   Future     Standing Expiration Date:   4/4/2023    CBC     Standing Status:   Future     Standing Expiration Date:   4/4/2023    Magnesium     Standing Status:   Future     Standing Expiration Date:   4/4/2023    TSH     Standing Status:   Future     Standing Expiration Date:   4/4/2023    T4, Free     Standing Status:   Future     Standing Expiration Date:   4/4/2023    Lipid Panel     Standing Status:   Future     Standing Expiration Date:   4/4/2023     Order Specific Question:   Is Patient Fasting?/# of Hours     Answer:   no     No follow-ups on file. Patient voiced understanding and agreed to treatment plan. Electronically signed by Jesus Loredo MD on 4/4/2022 at 9:57 AM    This note is created with a voice recognition program and while intend to generate a document that accurately reflects the content of the visit, no guarantee can be provided that every mistake has been identified and corrected by editing.

## 2022-04-05 ENCOUNTER — HOSPITAL ENCOUNTER (OUTPATIENT)
Age: 87
Setting detail: SPECIMEN
Discharge: HOME | End: 2022-04-05

## 2022-04-05 DIAGNOSIS — E03.9 HYPOTHYROIDISM, UNSPECIFIED TYPE: ICD-10-CM

## 2022-04-05 DIAGNOSIS — I10 ESSENTIAL HYPERTENSION: ICD-10-CM

## 2022-04-05 DIAGNOSIS — E78.00 HIGH CHOLESTEROL: ICD-10-CM

## 2022-04-20 ENCOUNTER — TELEPHONE (OUTPATIENT)
Dept: INTERNAL MEDICINE CLINIC | Age: 87
End: 2022-04-20

## 2022-04-20 NOTE — TELEPHONE ENCOUNTER
----- Message from Yvonne Curtis sent at 4/20/2022  2:40 PM EDT -----  Subject: Referral Request    QUESTIONS   Reason for referral request? pt went to get lab done and was handed back a   form and told that she couldn't get it done because the form was wrong but   when she got home and realized it was the wrong person and wasn't able to   do her lab work, needs new orders printed for the lab work. Has the physician seen you for this condition before? No   Preferred Specialist (if applicable)? Do you already have an appointment scheduled? Additional Information for Provider?   ---------------------------------------------------------------------------  --------------  CALL BACK INFO  What is the best way for the office to contact you? OK to leave message on   voicemail  Preferred Call Back Phone Number? [phone]  ---------------------------------------------------------------------------  --------------  SCRIPT ANSWERS  Relationship to Patient?  Self

## 2022-04-20 NOTE — TELEPHONE ENCOUNTER
Spoke with patient and she will go to [facility] to get lab work done with her ID

## 2022-05-09 ENCOUNTER — OFFICE VISIT (OUTPATIENT)
Dept: INTERNAL MEDICINE CLINIC | Age: 87
End: 2022-05-09
Payer: MEDICARE

## 2022-05-09 VITALS
RESPIRATION RATE: 15 BRPM | HEIGHT: 64 IN | WEIGHT: 160.2 LBS | OXYGEN SATURATION: 95 % | BODY MASS INDEX: 27.35 KG/M2 | DIASTOLIC BLOOD PRESSURE: 82 MMHG | SYSTOLIC BLOOD PRESSURE: 144 MMHG | HEART RATE: 76 BPM | TEMPERATURE: 97.2 F

## 2022-05-09 DIAGNOSIS — I10 ESSENTIAL HYPERTENSION: ICD-10-CM

## 2022-05-09 DIAGNOSIS — L98.9 BENIGN SKIN LESION: ICD-10-CM

## 2022-05-09 DIAGNOSIS — R41.3 MEMORY PROBLEM: Primary | ICD-10-CM

## 2022-05-09 PROCEDURE — G8427 DOCREV CUR MEDS BY ELIG CLIN: HCPCS | Performed by: INTERNAL MEDICINE

## 2022-05-09 PROCEDURE — 1036F TOBACCO NON-USER: CPT | Performed by: INTERNAL MEDICINE

## 2022-05-09 PROCEDURE — 4040F PNEUMOC VAC/ADMIN/RCVD: CPT | Performed by: INTERNAL MEDICINE

## 2022-05-09 PROCEDURE — 99214 OFFICE O/P EST MOD 30 MIN: CPT | Performed by: INTERNAL MEDICINE

## 2022-05-09 PROCEDURE — 1090F PRES/ABSN URINE INCON ASSESS: CPT | Performed by: INTERNAL MEDICINE

## 2022-05-09 PROCEDURE — G8417 CALC BMI ABV UP PARAM F/U: HCPCS | Performed by: INTERNAL MEDICINE

## 2022-05-09 PROCEDURE — 1123F ACP DISCUSS/DSCN MKR DOCD: CPT | Performed by: INTERNAL MEDICINE

## 2022-05-09 ASSESSMENT — PATIENT HEALTH QUESTIONNAIRE - PHQ9
2. FEELING DOWN, DEPRESSED OR HOPELESS: 0
SUM OF ALL RESPONSES TO PHQ9 QUESTIONS 1 & 2: 0
SUM OF ALL RESPONSES TO PHQ QUESTIONS 1-9: 0
1. LITTLE INTEREST OR PLEASURE IN DOING THINGS: 0
SUM OF ALL RESPONSES TO PHQ QUESTIONS 1-9: 0

## 2022-05-09 NOTE — PROGRESS NOTES
Evelia Horn is a 80 y.o. female who presents for   Chief Complaint   Patient presents with    Hypertension    Hypothyroidism    Health Maintenance     awv, shing, covid    Mole     pt states she has a mole on her back she would like to be checked to [facility] sure its not a tick.  Memory Loss     pt states shes having memory issues. and follow up of chronic medical problems. Patient Active Problem List   Diagnosis    HBP (high blood pressure)    Constitutional obesity    Hypothyroidism    Shortness of breath     HPI  Here for follow-up and wants to check a lesion on the upper back and also discuss about memory issues    Current Outpatient Medications   Medication Sig Dispense Refill    amLODIPine (NORVASC) 5 MG tablet Take 1 tablet by mouth daily 30 tablet 2    lisinopril-hydroCHLOROthiazide (PRINZIDE;ZESTORETIC) 20-25 MG per tablet take 1 tablet by mouth once daily 90 tablet 1    levothyroxine (SYNTHROID) 25 MCG tablet take 1 tablet by mouth once daily 90 tablet 1     No current facility-administered medications for this visit. Allergies   Allergen Reactions    Codeine        Past Medical History:   Diagnosis Date    HBP (high blood pressure)     Hematuria 4-21-16    Patient had a complete workup in the past    Hyperglycemia        No past surgical history on file.     Family History   Problem Relation Age of Onset    High Blood Pressure Mother     High Blood Pressure Father     Asthma Brother     Cancer Maternal Uncle         colon     ROS   Constitutional:  Negative for fatigue, loss of appetite and unexpected weight change   HEENT            : Negative for neck stiffness and pain, no congestion or sinus pressure   Eyes                : No visual disturbance or pain   Cardiovascular: No chest pain or palpitations or leg swelling   Respiratory      : Negative for cough, shortness of breath or wheezing   Gastrointestinal: Negative for abdominal pain, constipation or diarrhea and bloating No nausea or vomiting   Genitourinary:     No urgency or frequency, no burning or hematuria   Musculoskeletal: No arthralgias, back pain or myalgias   Skin                  : Negative for rash or erythema   Neurological    : Negative for dizziness, weakness, tremors ,light headedness or syncope   Psychiatric       : Negative for dysphoric mood, sleep disturbances, nervous or anxious, or decreased concentration   All other review of systems was negative    Objective  Physical Examination:    Nursing note reviewed    BP (!) 144/82 (Site: Left Upper Arm, Position: Sitting, Cuff Size: Medium Adult)   Pulse 76   Temp 97.2 °F (36.2 °C) (Temporal)   Resp 15   Ht 5' 4.02\" (1.626 m)   Wt 160 lb 3.2 oz (72.7 kg)   SpO2 95%   BMI 27.48 kg/m²   BP Readings from Last 3 Encounters:   05/09/22 (!) 144/82   04/04/22 130/84   12/06/21 (!) 172/100         Constitutional:  Danielle Mackey is oriented to place, person and time ,appears well-developed and well-nourished  HEENT:  Atraumatic and normocephalic, external ears normal bilaterally, nose normal no oropharyngeal exudate and is clear and moist  Eyes:  EOCM normal; conjunctivae normal; PERRLA bilaterally  Neck:  Normal range of motion, neck supple, no JVD and no thyromegaly  Cardiovascular:  RRR, normal heart sounds and intact distal pulses  Pulmonary:  effort normal and breath sounds normal bilaterally,no wheezes or rales, no respiratory distress  Abdominal:  Soft, non-tender; normal bowel sounds, no masses  Musculoskeletal:  Normal range of motion and no edema or tenderness bilaterally  No lymphadenopathy  Neurological:  alert, oriented, and normal reflexes bilaterally  Skin: warm and dry  Psychiatric:  normal mood and effect; behavior normal.    Labs:   Lab Results   Component Value Date    LABA1C 5.6 03/08/2018     Lab Results   Component Value Date    CHOL 140 03/08/2018     Lab Results   Component Value Date    HDL 61 03/08/2018     Lab Results Component Value Date    LDLCALC 64 03/08/2018     Lab Results   Component Value Date    TRIG 73 03/08/2018     No components found for: [place], [place]  Lab Results   Component Value Date    WBC 7.3 06/10/2019    HGB 14.0 06/10/2019    HCT 41.2 06/10/2019    MCV 92 06/10/2019     06/10/2019     No results found for: INR, PROTIME  Lab Results   Component Value Date    GLUCOSE 92 06/10/2019    CREATININE 0.87 12/14/2021    BUN 14 06/10/2019     06/10/2019    K 4.4 06/10/2019     06/10/2019    CO2 28 06/10/2019     Lab Results   Component Value Date    ALT 14 06/10/2019    AST 21 06/10/2019    ALKPHOS 74 06/10/2019    BILITOT 0.7 06/10/2019     Lab Results   Component Value Date    LABALBU 4.0 06/10/2019     Lab Results   Component Value Date    TSH 3.01 06/10/2019     Assessment:  1. Memory problem    2. Essential hypertension    3. Benign skin lesion        Plan:  Patient's blood pressure is stable and continue current treatment  Patient complaining of memory issues and her MMSE decreased to 20 out of 30 and patient advised to follow-up with neurology whom she has seen last year and a referral was made  Also a CT of the head was ordered  Patient did not get the lab work done and advised patient to get the lab work done and a new lab order was given  Patient advised not to drive and patient is here today without any of the caregivers  Benign skin lesion present on the left side of the thorax in the posterior axillary line and reassurance given  Review in 6 weeks           1. Shasta received counseling on the following healthy behaviors: nutrition and exercise    2. Prior labs and health maintenance reviewed. 3.  Discussed use, benefit, and side effects of prescribed medications. Barriers to medication compliance addressed. All her questions were answered. Pt voiced understanding. Mari Yao will continue current medications, diet and exercise.             No orders of the defined types were placed in this encounter. Completed Refills               Requested Prescriptions      No prescriptions requested or ordered in this encounter     4. Patient given educational materials - see patient instructions    5. Was a self-tracking handout given in paper form or via [employer]? NO    Orders Placed This Encounter   Procedures    CT HEAD WO CONTRAST     Standing Status:   Future     Standing Expiration Date:   5/9/2023    External Referral To Neurology     Referral Priority:   Routine     Referral Type:   Eval and Treat     Referral Reason:   Specialty Services Required     Referred to Provider:   Kev Ruffin MD     Requested Specialty:   Neurology     Number of Visits Requested:   1     Return in about 6 weeks (around 6/20/2022). Patient voiced understanding and agreed to treatment plan. Electronically signed by Courtney Mac MD on 5/9/2022 at 11:47 AM    This note is created with a voice recognition program and while intend to generate a document that accurately reflects the content of the visit, no guarantee can be provided that every mistake has been identified and corrected by editing.

## 2022-05-23 ENCOUNTER — TELEPHONE (OUTPATIENT)
Dept: INTERNAL MEDICINE CLINIC | Age: 87
End: 2022-05-23

## 2022-05-23 NOTE — TELEPHONE ENCOUNTER
----- Message from Michael Patino sent at 5/23/2022  3:22 PM EDT -----  Subject: Message to Provider    QUESTIONS  Information for Provider? Pt states PCP Priscilla Koyanagi knows of her current   symptoms including her forgetfulness. Pt wants to know if PCP said to call   the Neurology referral or if the referred office would call her. Pt lost   the referral paperwork and has not heard anything from the Neurology   referral. Please let Pt know.   ---------------------------------------------------------------------------  --------------  CALL BACK INFO  What is the best way for the office to contact you? OK to leave message on   voicemail  Preferred Call Back Phone Number? [phone]  ---------------------------------------------------------------------------  --------------  SCRIPT ANSWERS  Relationship to Patient?  Self

## 2022-08-18 ENCOUNTER — TELEPHONE (OUTPATIENT)
Dept: INTERNAL MEDICINE CLINIC | Age: 87
End: 2022-08-18

## 2022-08-18 NOTE — TELEPHONE ENCOUNTER
FYI:    Patient is in the pscy julian at [facility]    Phone # [phone]  Fax #[phone]    I sent a medication list to them

## 2022-08-19 ENCOUNTER — TELEPHONE (OUTPATIENT)
Dept: INTERNAL MEDICINE CLINIC | Age: 87
End: 2022-08-19

## 2022-08-19 NOTE — TELEPHONE ENCOUNTER
Patient is in patient at [place] [id number] julian    Daughter states that she is asking to be transferred to [facility]?     Please advise

## 2022-08-22 ENCOUNTER — TELEPHONE (OUTPATIENT)
Dept: INTERNAL MEDICINE CLINIC | Age: 87
End: 2022-08-22

## 2022-08-22 NOTE — TELEPHONE ENCOUNTER
Patient grandson asking that per Evelin Pod she would like you to call her, he says that its because she hasn't seen you in awhile.     We explained that you didn't have anything to do with her being the hospital but she is still asking to speak with you    Please advise

## 2022-08-22 NOTE — TELEPHONE ENCOUNTER
Left message with Esteban Body at the facility patient is at stated she will give patient the message of Dr franz

## 2022-09-02 ENCOUNTER — TELEPHONE (OUTPATIENT)
Dept: INTERNAL MEDICINE CLINIC | Age: 87
End: 2022-09-02

## 2022-09-02 NOTE — TELEPHONE ENCOUNTER
FYI  Patient family calling stating that patient keeps calling 911 for no reason   Family states that EMS wants to pink slip her to home care  Spoke with Lloyd Negrete and she can assist on needs

## 2022-09-08 ENCOUNTER — CARE COORDINATION (OUTPATIENT)
Dept: CARE COORDINATION | Age: 87
End: 2022-09-08

## 2022-09-08 NOTE — CARE COORDINATION
Ambulatory Care Coordination Note  9/8/2022    ACC: Lisa Loaiza, RN    Summary Note: Outreach call and spoke to patient's daughter, introduced self and informed of reason for call. Job Mansfield who states that patient is currently in [facility] unit and having more testing done. Informed her that patient has office appointment on 9/13/2022, states she will call to cancel if not discharged tomorrow or Monday. ACM will follow up when patient is discharged.        Future Appointments   Date Time Provider Jade Keller   9/13/2022 12:15 PM Bautista Ross MD [facility] [address] [facility]

## 2022-09-22 ENCOUNTER — TELEPHONE (OUTPATIENT)
Dept: INTERNAL MEDICINE CLINIC | Age: 87
End: 2022-09-22

## 2022-09-22 NOTE — TELEPHONE ENCOUNTER
Óscar oLya calling [phone] to ask if you will follow for home care   Patient is due to be discharged from [facility] to her granddaughters home and Jacek Spear is going to provide PT and possible Nursing

## 2022-10-04 ENCOUNTER — CARE COORDINATION (OUTPATIENT)
Dept: CARE COORDINATION | Age: 87
End: 2022-10-04

## 2022-10-04 NOTE — CARE COORDINATION
This encounter was created in error - please disregard, Diagnosis: 20950 Erroneous Encounter - Disregard, Level of Service: ERR1 Erroneous Encounter - Disregard.

## 2022-10-12 ENCOUNTER — OFFICE VISIT (OUTPATIENT)
Dept: INTERNAL MEDICINE CLINIC | Age: 87
End: 2022-10-12

## 2022-10-12 VITALS
SYSTOLIC BLOOD PRESSURE: 130 MMHG | RESPIRATION RATE: 18 BRPM | HEIGHT: 64 IN | HEART RATE: 73 BPM | TEMPERATURE: 97.2 F | BODY MASS INDEX: 25.23 KG/M2 | DIASTOLIC BLOOD PRESSURE: 82 MMHG | WEIGHT: 147.8 LBS | OXYGEN SATURATION: 98 %

## 2022-10-12 DIAGNOSIS — G30.9 ALZHEIMER'S DISEASE, UNSPECIFIED (CODE) (HCC): ICD-10-CM

## 2022-10-12 DIAGNOSIS — Z09 HOSPITAL DISCHARGE FOLLOW-UP: ICD-10-CM

## 2022-10-12 DIAGNOSIS — I10 ESSENTIAL HYPERTENSION: ICD-10-CM

## 2022-10-12 DIAGNOSIS — E03.9 HYPOTHYROIDISM, UNSPECIFIED TYPE: Primary | ICD-10-CM

## 2022-10-12 RX ORDER — ETHYL ALCOHOL 62 %
GEL (ML) TOPICAL
COMMUNITY
Start: 2022-09-20 | End: 2022-10-12 | Stop reason: ALTCHOICE

## 2022-10-12 RX ORDER — LISINOPRIL AND HYDROCHLOROTHIAZIDE 12.5; 1 MG/1; MG/1
1 TABLET ORAL DAILY
Qty: 30 TABLET | Refills: 2 | Status: SHIPPED | OUTPATIENT
Start: 2022-10-12

## 2022-10-12 RX ORDER — LISINOPRIL AND HYDROCHLOROTHIAZIDE 25; 20 MG/1; MG/1
TABLET ORAL
Qty: 30 TABLET | Refills: 2 | Status: SHIPPED | OUTPATIENT
Start: 2022-10-12 | End: 2022-10-12 | Stop reason: DRUGHIGH

## 2022-10-12 RX ORDER — ASPIRIN 81 MG/1
TABLET, COATED ORAL
COMMUNITY
Start: 2022-09-20 | End: 2022-10-14 | Stop reason: SDUPTHER

## 2022-10-12 RX ORDER — RISPERIDONE 0.25 MG/1
TABLET, FILM COATED ORAL
COMMUNITY
Start: 2022-09-20 | End: 2022-10-12 | Stop reason: ALTCHOICE

## 2022-10-12 RX ORDER — LEVOTHYROXINE SODIUM 0.03 MG/1
TABLET ORAL
Qty: 30 TABLET | Refills: 2 | Status: SHIPPED | OUTPATIENT
Start: 2022-10-12

## 2022-10-12 RX ORDER — DONEPEZIL HYDROCHLORIDE 5 MG/1
5 TABLET, FILM COATED ORAL NIGHTLY
Qty: 30 TABLET | Refills: 2 | Status: SHIPPED | OUTPATIENT
Start: 2022-10-12

## 2022-10-12 SDOH — ECONOMIC STABILITY: FOOD INSECURITY: WITHIN THE PAST 12 MONTHS, YOU WORRIED THAT YOUR FOOD WOULD RUN OUT BEFORE YOU GOT MONEY TO BUY MORE.: NEVER TRUE

## 2022-10-12 SDOH — ECONOMIC STABILITY: FOOD INSECURITY: WITHIN THE PAST 12 MONTHS, THE FOOD YOU BOUGHT JUST DIDN'T LAST AND YOU DIDN'T HAVE MONEY TO GET MORE.: NEVER TRUE

## 2022-10-12 ASSESSMENT — PATIENT HEALTH QUESTIONNAIRE - PHQ9
1. LITTLE INTEREST OR PLEASURE IN DOING THINGS: 0
SUM OF ALL RESPONSES TO PHQ QUESTIONS 1-9: 0
2. FEELING DOWN, DEPRESSED OR HOPELESS: 0
SUM OF ALL RESPONSES TO PHQ QUESTIONS 1-9: 0
SUM OF ALL RESPONSES TO PHQ QUESTIONS 1-9: 0
SUM OF ALL RESPONSES TO PHQ9 QUESTIONS 1 & 2: 0
SUM OF ALL RESPONSES TO PHQ QUESTIONS 1-9: 0

## 2022-10-12 ASSESSMENT — SOCIAL DETERMINANTS OF HEALTH (SDOH): HOW HARD IS IT FOR YOU TO PAY FOR THE VERY BASICS LIKE FOOD, HOUSING, MEDICAL CARE, AND HEATING?: NOT HARD AT ALL

## 2022-10-12 NOTE — PROGRESS NOTES
Post-Discharge Transitional Care Management Progress Note      Jeff Traore   YOB: 1935    Date of Office Visit:  10/12/2022  Date of Hospital Admission: 7/14/2022  Date of Hospital Discharge: 7/20/2022    Care management risk score Rising risk (score 2-5) and Complex Care (Scores >=6): No Risk Score On File     Non face to face  following discharge, date last encounter closed (first attempt may have been earlier): *No documented post hospital discharge outreach found in the last 14 days *No documented post hospital discharge outreach found in the last 14 days    Call initiated 2 business days of discharge: *No response recorded in the last 14 days    ASSESSMENT/PLAN:   Hypothyroidism, unspecified type  Essential hypertension  Alzheimer's disease, unspecified  Hospital discharge follow-up  -     CT DISCHARGE MEDS RECONCILED W/ CURRENT OUTPATIENT MED LIST      Medical Decision Making: high complexity  Return in about 1 month (around 11/12/2022). On this date 10/12/2022 I have spent 40 minutes reviewing previous notes, test results and face to face with the patient discussing the diagnosis and importance of compliance with the treatment plan as well as documenting on the day of the visit. Subjective:   HPI:  Follow up of Hospital problems/diagnosis(es): Patient was admitted in [facility] for agitation and was diagnosed with dementia with behavioral changes and patient was started on Remeron and patient was getting confused and increased drowsiness and patient has multiple visits to the emergency room and currently here to discuss about the stopping the medicine and try something for memory    Inpatient course: Discharge summary reviewed- see chart.     Interval history/Current status: Advised patient to stop the Remeron and patient not taking any other medications for her blood pressure or thyroid and her last TSH was 8.17 and so advised patient to go back on lisinopril hydrochlorothiazide 10/12.5 once a day for blood pressure and Synthroid 25 mcg for her thyroid issues and also patient advised to start on Aricept 5 mg daily and side effects explained to the patient and daughter  Advised to call me back if any issues otherwise follow in office  Review in 1 month    Patient Active Problem List   Diagnosis    HBP (high blood pressure)    Constitutional obesity    Hypothyroidism    Shortness of breath    Alzheimer's disease, unspecified       Medications listed as ordered at the time of discharge from hospital     Medication List            Accurate as of October 12, 2022  1:55 PM. If you have any questions, ask your nurse or doctor.                 START taking these medications      donepezil 5 MG tablet  Commonly known as: Aricept  Take 1 tablet by mouth nightly  Started by: Pavithra Dean MD     lisinopril-hydroCHLOROthiazide 10-12.5 MG per tablet  Commonly known as: PRINZIDE;ZESTORETIC  Take 1 tablet by mouth daily  Replaces: lisinopril-hydroCHLOROthiazide 20-25 MG per tablet  Started by: Pavithra Dean MD            CONTINUE taking these medications      Aspirin Low Dose 81 MG EC tablet  Generic drug: aspirin     levothyroxine 25 MCG tablet  Commonly known as: SYNTHROID  take 1 tablet by mouth once daily            STOP taking these medications      amLODIPine 5 MG tablet  Commonly known as: NORVASC  Stopped by: Pavithra Dean MD     lisinopril-hydroCHLOROthiazide 20-25 MG per tablet  Commonly known as: PRINZIDE;ZESTORETIC  Replaced by: lisinopril-hydroCHLOROthiazide 10-12.5 MG per tablet  Stopped by: Pavithra Dean MD     RA [facility] 8.6-50 MG per tablet  Generic drug: senna-docusate  Stopped by: Pavithra Dean MD     risperiDONE 0.25 MG tablet  Commonly known as: RISPERDAL  Stopped by: Pavithra Dean MD               Where to Get Your Medications        These medications were sent to [address] [place] [address], [facility] [address] - F [phone] [address] [place] [address]      Phone: [phone]   donepezil 5 MG tablet  levothyroxine 25 MCG tablet  lisinopril-hydroCHLOROthiazide 10-12.5 MG per tablet           Medications marked \"taking\" at this time  Outpatient Medications Marked as Taking for the 10/12/22 encounter (Office Visit) with Kash Martinez MD   Medication Sig Dispense Refill    ASPIRIN LOW DOSE 81 MG EC tablet take 1 tablet by mouth every morning DO NOT START BEFORE SEPTEMBER 21ST, 2022      donepezil (ARICEPT) 5 MG tablet Take 1 tablet by mouth nightly 30 tablet 2    levothyroxine (SYNTHROID) 25 MCG tablet take 1 tablet by mouth once daily 30 tablet 2    lisinopril-hydroCHLOROthiazide (PRINZIDE;ZESTORETIC) 10-12.5 MG per tablet Take 1 tablet by mouth daily 30 tablet 2        Medications patient taking as of now reconciled against medications ordered at time of hospital discharge: Yes    A comprehensive review of systems was negative except for what was noted in the HPI.     Objective:    /82 (Site: Left Upper Arm, Position: Sitting, Cuff Size: Medium Adult)   Pulse 73   Temp 97.2 °F (36.2 °C) (Temporal)   Resp 18   Ht 5' 4.02\" (1.626 m)   Wt 147 lb 12.8 oz (67 kg)   SpO2 98%   BMI 25.36 kg/m²   General Appearance: alert and oriented to person, place and time, well developed and well- nourished, in no acute distress  Skin: warm and dry, no rash or erythema  Head: normocephalic and atraumatic  Eyes: pupils equal, round, and reactive to light, extraocular eye movements intact, conjunctivae normal  ENT: tympanic membrane, external ear and ear canal normal bilaterally, nose without deformity, nasal mucosa and turbinates normal without polyps  Neck: supple and non-tender without mass, no thyromegaly or thyroid nodules, no cervical lymphadenopathy  Pulmonary/Chest: clear to auscultation bilaterally- no wheezes, rales or rhonchi, normal air movement, no respiratory distress  Cardiovascular: normal rate, regular rhythm, normal S1 and S2, no murmurs, rubs, clicks, or gallops, distal pulses intact, no carotid bruits  Abdomen: soft, non-tender, non-distended, normal bowel sounds, no masses or organomegaly  Extremities: no cyanosis, clubbing or edema  Musculoskeletal: normal range of motion, no joint swelling, deformity or tenderness  Neurologic: reflexes normal and symmetric, no cranial nerve deficit, gait, coordination and speech normal    An electronic signature was used to authenticate this note.   --Shante Morgan MD

## 2022-10-13 NOTE — TELEPHONE ENCOUNTER
Cintia Bee is calling to request a refill on the following medication(s):    Medication Request:  Requested Prescriptions     Pending Prescriptions Disp Refills    ASPIRIN LOW DOSE 81 MG EC tablet 30 tablet 2     Sig: take 1 tablet by mouth every morning DO NOT START BEFORE SEPTEMBER 21ST, 2022    risperiDONE (RISPERDAL) 0.25 MG tablet 60 tablet 2     Sig: take 1 tablet by mouth every morning and at bedtime       Last Visit Date (If Applicable):  58/86/6229    Next Visit Date:    11/15/2022

## 2022-10-14 RX ORDER — RISPERIDONE 0.25 MG/1
TABLET, FILM COATED ORAL
Qty: 60 TABLET | Refills: 1 | Status: SHIPPED | OUTPATIENT
Start: 2022-10-14 | End: 2022-10-14 | Stop reason: ALTCHOICE

## 2022-10-14 RX ORDER — ASPIRIN 81 MG/1
TABLET, COATED ORAL
Qty: 30 TABLET | Refills: 1 | Status: SHIPPED | OUTPATIENT
Start: 2022-10-14

## 2022-10-18 ENCOUNTER — TELEPHONE (OUTPATIENT)
Dept: INTERNAL MEDICINE CLINIC | Age: 87
End: 2022-10-18

## 2022-10-18 DIAGNOSIS — I10 ESSENTIAL HYPERTENSION: ICD-10-CM

## 2022-10-18 DIAGNOSIS — Z91.81 AT HIGH RISK FOR FALLS: ICD-10-CM

## 2022-10-18 DIAGNOSIS — G30.9 ALZHEIMER'S DISEASE, UNSPECIFIED (CODE) (HCC): Primary | ICD-10-CM

## 2022-10-18 DIAGNOSIS — I10 HYPERTENSION, UNSPECIFIED TYPE: ICD-10-CM

## 2022-10-18 NOTE — TELEPHONE ENCOUNTER
Bandar Mckeon from [facility] [facility] care called stating that patient's family is interested in Home care and she is requesting new orders for skilled nursing and home health aid with behavioral health nurse.  Please fax [phone] ATTN: Bandar Mckeon  Any question please call [phone]

## 2022-10-31 ENCOUNTER — TELEPHONE (OUTPATIENT)
Dept: INTERNAL MEDICINE CLINIC | Age: 87
End: 2022-10-31

## 2022-10-31 NOTE — TELEPHONE ENCOUNTER
----- Message from Odilon Ervin sent at 10/31/2022  2:37 PM EDT -----  Subject: Message to Provider    QUESTIONS  Information for Provider? pt daughter Ibrahima Del Castillo wants to know if pt can take   prevagin for memory. please let her know  ---------------------------------------------------------------------------  --------------  CALL BACK INFO  [phone]; OK to leave message on voicemail  ---------------------------------------------------------------------------  --------------  SCRIPT ANSWERS  Relationship to Patient? Other  Representative Name? Reginald Velazquez  Is the Representative on the appropriate HIPAA document in Epic?  Yes

## 2022-11-15 ENCOUNTER — TELEPHONE (OUTPATIENT)
Dept: INTERNAL MEDICINE CLINIC | Age: 87
End: 2022-11-15

## 2022-11-15 ENCOUNTER — OFFICE VISIT (OUTPATIENT)
Dept: INTERNAL MEDICINE CLINIC | Age: 87
End: 2022-11-15
Payer: MEDICARE

## 2022-11-15 VITALS
HEART RATE: 48 BPM | DIASTOLIC BLOOD PRESSURE: 78 MMHG | WEIGHT: 146.4 LBS | SYSTOLIC BLOOD PRESSURE: 120 MMHG | RESPIRATION RATE: 15 BRPM | HEIGHT: 64 IN | BODY MASS INDEX: 25 KG/M2 | OXYGEN SATURATION: 98 % | TEMPERATURE: 97.4 F

## 2022-11-15 DIAGNOSIS — I10 ESSENTIAL HYPERTENSION: Primary | ICD-10-CM

## 2022-11-15 DIAGNOSIS — G30.9 ALZHEIMER'S DISEASE, UNSPECIFIED (CODE) (HCC): ICD-10-CM

## 2022-11-15 DIAGNOSIS — E03.9 HYPOTHYROIDISM, UNSPECIFIED TYPE: ICD-10-CM

## 2022-11-15 DIAGNOSIS — I49.3 PVC'S (PREMATURE VENTRICULAR CONTRACTIONS): ICD-10-CM

## 2022-11-15 PROCEDURE — G8427 DOCREV CUR MEDS BY ELIG CLIN: HCPCS | Performed by: INTERNAL MEDICINE

## 2022-11-15 PROCEDURE — 1036F TOBACCO NON-USER: CPT | Performed by: INTERNAL MEDICINE

## 2022-11-15 PROCEDURE — 93000 ELECTROCARDIOGRAM COMPLETE: CPT | Performed by: INTERNAL MEDICINE

## 2022-11-15 PROCEDURE — G8484 FLU IMMUNIZE NO ADMIN: HCPCS | Performed by: INTERNAL MEDICINE

## 2022-11-15 PROCEDURE — 1123F ACP DISCUSS/DSCN MKR DOCD: CPT | Performed by: INTERNAL MEDICINE

## 2022-11-15 PROCEDURE — 99214 OFFICE O/P EST MOD 30 MIN: CPT | Performed by: INTERNAL MEDICINE

## 2022-11-15 PROCEDURE — G8417 CALC BMI ABV UP PARAM F/U: HCPCS | Performed by: INTERNAL MEDICINE

## 2022-11-15 PROCEDURE — 1090F PRES/ABSN URINE INCON ASSESS: CPT | Performed by: INTERNAL MEDICINE

## 2022-11-15 NOTE — TELEPHONE ENCOUNTER
----- Message from Kayla Gupta sent at 11/15/2022  9:39 AM EST -----  Subject: Message to Provider    QUESTIONS  Information for Provider? Rock Yuen see's Katie Rivera   and was wanting to know if she can get a letter for meals on wheels so to   make sure she is eating. They would like to get someone to go by her house   to make sure she's doing okay and to see if she is eating. They also need   something stating that she is not able to take care of herself to try to   get her some assistance. ---------------------------------------------------------------------------  --------------  Maral Tello INFO  [phone]; OK to leave message on voicemail  ---------------------------------------------------------------------------  --------------  SCRIPT ANSWERS  Relationship to Patient? Third Party  Third Party Type?  Other  Other Third Party Type? son  Representative Name? Sharri Tompkins

## 2022-11-15 NOTE — PROGRESS NOTES
Lina Song is a 80 y.o. female who presents for   Chief Complaint   Patient presents with    Memory Loss     Follow up on [facility] Maintenance     Tdap, shingles, awv, covid, flu    and follow up of chronic medical problems. Patient Active Problem List   Diagnosis    HBP (high blood pressure)    Constitutional obesity    Hypothyroidism    Shortness of breath    Alzheimer's disease, unspecified     HPI  Here for follow-up and patient did not take medications and was not sure granddaughter is here with her    Current Outpatient Medications   Medication Sig Dispense Refill    ASPIRIN LOW DOSE 81 MG EC tablet take 1 tablet by mouth every morning DO NOT START BEFORE SEPTEMBER 21ST, 2022 (Patient not taking: Reported on 11/15/2022) 30 tablet 1    donepezil (ARICEPT) 5 MG tablet Take 1 tablet by mouth nightly (Patient not taking: Reported on 11/15/2022) 30 tablet 2    levothyroxine (SYNTHROID) 25 MCG tablet take 1 tablet by mouth once daily (Patient not taking: Reported on 11/15/2022) 30 tablet 2    lisinopril-hydroCHLOROthiazide (PRINZIDE;ZESTORETIC) 10-12.5 MG per tablet Take 1 tablet by mouth daily (Patient not taking: Reported on 11/15/2022) 30 tablet 2     No current facility-administered medications for this visit. Allergies   Allergen Reactions    Codeine        Past Medical History:   Diagnosis Date    HBP (high blood pressure)     Hematuria 4-21-16    Patient had a complete workup in the past    Hyperglycemia        No past surgical history on file.     Family History   Problem Relation Age of Onset    High Blood Pressure Mother     High Blood Pressure Father     Asthma Brother     Cancer Maternal Uncle         colon     ROS  Constitutional:  Negative for fatigue, loss of appetite and unexpected weight change  HEENT            : Negative for neck stiffness and pain, no congestion or sinus pressure  Eyes                : No visual disturbance or pain  Cardiovascular: No chest pain or palpitations or leg swelling  Respiratory      : Negative for cough, shortness of breath or wheezing  Gastrointestinal: Negative for abdominal pain, constipation or diarrhea and bloating No nausea or vomiting  Genitourinary:     No urgency or frequency, no burning or hematuria  Musculoskeletal: No arthralgias, back pain or myalgias  Skin                  : Negative for rash or erythema  Neurological    : Negative for dizziness, weakness, tremors ,light headedness or syncope  Psychiatric       : Negative for dysphoric mood, sleep disturbances, nervous or anxious, or decreased concentration  All other review of systems was negative    Objective  Physical Examination:    Nursing note reviewed    /78 (Site: Left Upper Arm, Position: Sitting, Cuff Size: Medium Adult)   Pulse (!) 48   Temp 97.4 °F (36.3 °C) (Temporal)   Resp 15   Ht 5' 4.02\" (1.626 m)   Wt 146 lb 6.4 oz (66.4 kg)   SpO2 98%   BMI 25.12 kg/m²   BP Readings from Last 3 Encounters:   11/15/22 120/78   10/12/22 130/82   05/09/22 (!) 144/82         Constitutional:  Jim Jara is not oriented to place, person and time ,appears fairly developed  HEENT:  Atraumatic and normocephalic, external ears normal bilaterally, nose normal no oropharyngeal exudate and is clear and moist  Eyes:  EOCM normal; conjunctivae normal; PERRLA bilaterally  Neck:  Normal range of motion, neck supple, no JVD and no thyromegaly  Cardiovascular:  RRR, normal heart sounds and intact distal pulses  Pulmonary:  effort normal and breath sounds normal bilaterally,no wheezes or rales, no respiratory distress  Abdominal:  Soft, non-tender; normal bowel sounds, no masses  Musculoskeletal:  Normal range of motion and no edema or tenderness bilaterally  No lymphadenopathy  Neurological:  alert, oriented, and normal reflexes bilaterally  Skin: warm and dry  Psychiatric:  normal mood and effect; behavior normal.    Labs:   Lab Results   Component Value Date    LABA1C 5.6 03/08/2018 Lab Results   Component Value Date    CHOL 140 03/08/2018     Lab Results   Component Value Date    HDL 61 03/08/2018     Lab Results   Component Value Date    LDLCALC 64 03/08/2018     Lab Results   Component Value Date    TRIG 73 03/08/2018     No results found for: [place], [place]  Lab Results   Component Value Date    WBC 7.3 06/10/2019    HGB 14.0 06/10/2019    HCT 41.2 06/10/2019    MCV 92 06/10/2019     06/10/2019     No results found for: INR, PROTIME  Lab Results   Component Value Date    GLUCOSE 92 06/10/2019    CREATININE 0.87 12/14/2021    BUN 14 06/10/2019     06/10/2019    K 4.4 06/10/2019     06/10/2019    CO2 28 06/10/2019     Lab Results   Component Value Date    ALT 14 06/10/2019    AST 21 06/10/2019    ALKPHOS 74 06/10/2019    BILITOT 0.7 06/10/2019     Lab Results   Component Value Date    LABALBU 4.0 06/10/2019     Lab Results   Component Value Date    TSH 3.01 06/10/2019     Assessment:  1. Essential hypertension    2. PVC's (premature ventricular contractions)    3. Alzheimer's disease, unspecified    4. Hypothyroidism, unspecified type        Plan:  Blood pressure is stable and patient is not sure whether she is taking the medications and same where she is not sure whether she is taking any of the medications for her thyroid or for her memory and patient does not even remember what she ate today and I did talk to the granddaughter who is present with her and explained to her that the risks involved and she needs assisted living placement or memory care and we also added get care coordinator involved to facilitate placement and also discussed with natalio motta  As patient had some PVCs we did an EKG in the office today to make sure that she is not in fibrillation  Review in 4 months           1. Shasta received counseling on the following healthy behaviors: nutrition and exercise    2. Prior labs and health maintenance reviewed.      3.  Discussed use, benefit, and side effects of prescribed medications. Barriers to medication compliance addressed. All her questions were answered. Pt voiced understanding. Baylee Babin will continue current medications, diet and exercise. No orders of the defined types were placed in this encounter. Completed Refills               Requested Prescriptions      No prescriptions requested or ordered in this encounter     4. Patient given educational materials - see patient instructions    5. Was a self-tracking handout given in paper form or via [employer]? NO    No orders of the defined types were placed in this encounter. No follow-ups on file. Patient voiced understanding and agreed to treatment plan. Electronically signed by Lisa Dong MD on 11/15/2022 at 3:01 PM    This note is created with a voice recognition program and while intend to generate a document that accurately reflects the content of the visit, no guarantee can be provided that every mistake has been identified and corrected by editing.

## 2022-11-16 ENCOUNTER — CARE COORDINATION (OUTPATIENT)
Dept: CARE COORDINATION | Age: 87
End: 2022-11-16

## 2022-11-16 NOTE — CARE COORDINATION
Ambulatory Care Coordination Note  11/16/2022    ACC: Ya Mcelroy, RN      ACM follow up PCP referral for assistance with Assisted Living or Memory Care placement. Sign Hx: Alzheimer disease, essential hypertension,PVC's, hypothyroidism, hearing loss, osteoarthritis    Outreach call and spoke to patient's daughter Sadie Gan, introduced self and informed of reason for call and explained Care Management role, they are receptive to [facility] calls. States she will call her mother and let her know that ACM will be calling her. She states that patient is not currently willing to leave her home and go to Assisted Living or Memory Care facility. Patient lives in own home and grandson is currently living with her and manages her medications and assists patient at home. States patient does not have transportation since she or patient's grandson do not drive. Requests to have meals delivered and transportation. ACM discussed that someone needs to come with her to appointments that knows about her care at home and medications. Patient currently has [facility] nurse visiting. Denies patient having any falls and does not use any assistive device for ambulation. Reconciled home medications and states that she is taking her medications which grandson manages. States she needs refills and needs them delivered. Patient only has Medicare A&B. Discussed with daughter about getting patient signed up for prescription coverage or a managed medicare and that now is the open enrollment. Reviewed and updated Demos and ACP, pt does not have a Living Will or POA. Encouraged to call nurse or the office with any questions or concerns or worsening of symptoms. ACM attempted to contact patient, no anser on home phone and lvm on mobile number to return call to this nurse. Outreach call to [facility] and spoke to pharmacist who states that patient's medications have been ready for pickup or delivery after receiving payment.  States they ran meds thru [facility] and her medications are less than $10 each. Nurse called daughter and informed her of the above and she states she will call patient and try to get payment done so meds can be delivered. Call to [facility] and [facility], [place] who states they are current with patient and she has been working with the son Espinoza Sanchez who lives in [place] and is trying to get POA. She is working with him on how to get this done. She verified that there are current concerns regarding patient living at home and that there is an open APS case by son and Purnima An. States a grandson lives with patient and is giving patient her medications but not sure if this is consistent. States the nurse went to visit couple days ago and no one answered the door for visit. The Aide is to visit on Friday. States the patient has been working with nurse when visiting but has not been participating with the home aide for ADL's. Jad Madrigal states she will call Espinoza Sanchez and inform him of the medications at [employer] and give him this nurse contact number. [facility] gave her contact number to call with updates or other needs. CM Plan of Care:    - add patient to [facility] panel, send [facility] letter. - Will follow up with patient again next week to assess s/s symptoms and additional needs,future appointment. - will send referral to get meals delivered and transportation. - will collaborate with [facility] and son Espinoza Sanchez.     - Update Dr. Benoit Galarza with present condition's and upcoming appointments.     Offered patient enrollment in the Remote Patient Monitoring (RPM) program for in-home monitoring: Patient is not eligible for RPM program.    Ambulatory Care Coordination Assessment    Care Coordination Protocol  Referral from Primary Care Provider: No  Week 1 - Initial Assessment     Do you have all of your prescriptions and are they filled?: Yes  Barriers to medication adherence: None  Are you able to afford your medications?: Yes  How often do you have trouble taking your medications the way you have been told to take them?: I always take them as prescribed. Do you have Home O2 Therapy?: No      Ability to seek help/take action for Emergent Urgent situations i.e. fire, crime, inclement weather or health crisis. : Dependent  Ability to ambulate to restroom: Independent  Ability handle personal hygeine needs (bathing/dressing/grooming): Independent  Ability to manage Medications: Dependent  Ability to prepare Food Preparation: Dependent  Ability to maintain home (clean home, laundry): Dependent  Ability to drive and/or has transportation: Dependent  Ability to do shopping: Dependent  Ability to manage finances: Needs Assistance  Is patient able to live independently?: No     Current Housing: Private Residence        Per the Fall Risk Screening, did the patient have 2 or more falls or 1 fall with injury in the past year?: No     Frequent urination at night?: No  Do you use rails/bars?: No  Do you have a non-slip tub mat?: No     Are you experiencing loss of meaning?: No  Are you experiencing loss of hope and peace?: No     Thinking about your patient's physical health needs, are there any symptoms or problems (risk indicators) you are unsure about that require further investigation?: No identified areas of uncertainly or problems already being investigated   Are the patients physical health problems impacting on their mental well-being?: No identified areas of concern   Are there any problems with your patients lifestyle behaviors (alcohol, drugs, diet, exercise) that are impacting on physical or mental well-being?: No identified areas of concern   Do you have any other concerns about your patients mental well-being?  How would you rate their severity and impact on the patient?: No identified areas of concern   How would you rate their home environment in terms of safety and stability (including domestic violence, insecure housing, neighbor harassment)?: Consistently safe, supportive, stable, no identified problems   How do daily activities impact on the patient's well-being? (include current or anticipated unemployment, work, caregiving, access to transportation or other): No identified problems or perceived positive benefits   How would you rate their social network (family, work, friends)?: Adequate participation with social networks   How would you rate their financial resources (including ability to afford all required medical care)?: Financially secure, resources adequate, no identified problems   How wells does the patient now understand their health and well-being (symptoms, signs or risk factors) and what they need to do to manage their health?: Reasonable to good understanding and already engages in managing health or is willing to undertake better management   How well do you think your patient can engage in healthcare discussions? (Barriers include language, deafness, aphasia, alcohol or drug problems, learning difficulties, concentration): Clear and open communication, no identified barriers   Do other services need to be involved to help this patient?: Other care/services in place but not sufficient   Are current services involved with this patient well-coordinated? (Include coordination with other services you are now recommendation): All required care/services in place and well-coordinated   Suggested Interventions and [place]: Not Started   Hospice: Completed (Comment: [facility])   Zone Management Tools: In Process         Set up/Review Goals, Set up/Review an Education Plan, Schedule an appointment with the patient's PCP              Prior to Admission medications    Medication Sig Start Date End Date Taking?  Authorizing Provider   ASPIRIN LOW DOSE 81 MG EC tablet take 1 tablet by mouth every morning DO NOT START BEFORE SEPTEMBER 21ST, 2022 10/14/22  Yes Ewelina Ocampo MD   donepezil (ARICEPT) 5 MG tablet Take 1 tablet by mouth nightly 10/12/22  Yes Seth Sheppard MD   levothyroxine (SYNTHROID) 25 MCG tablet take 1 tablet by mouth once daily 10/12/22  Yes Seth Sheppard MD   lisinopril-hydroCHLOROthiazide (PRINZIDE;ZESTORETIC) 10-12.5 MG per tablet Take 1 tablet by mouth daily 10/12/22  Yes Seth Sheppard MD       No future appointments.

## 2022-11-16 NOTE — LETTER
11/16/2022    Jo-Ann Rdz  [address] [place] [address] [employer] [id number]      Dear Jo-Ann Rdz,    My name is Cristal Watkins RN and I am a registered nurse who partners with Jero Roman MD to improve patients' health. Jero Roman MD believes you would benefit from working with me. As a member of your health care team, I would work with other providers involved in your care, offer education for your specific health conditions, and connect you with additional resources as needed. I will collaborate with Jero Roman MD to support you in following your treatment plan. The additional support I provide is no additional cost to you. My primary focus is to help you achieve specific goals and improve your health. We are committed to walk with you on this journey and look forward to working with you. Please call me to further discuss your healthcare needs. I am available by phone or for appointments at the office. You can reach me at [phone].     In good health,     Cristal Watkins RN

## 2022-11-22 ENCOUNTER — CARE COORDINATION (OUTPATIENT)
Dept: CARE COORDINATION | Age: 87
End: 2022-11-22

## 2022-11-22 NOTE — CARE COORDINATION
Ambulatory Care Coordination Note  11/23/2022    ACC: Yaniv Mckenzie, RN  Call with voicemail form Gianluca Arrington, RN with [facility], states that she received a call from patient's son regarding getting meals delivered and she is checking to see if this has been setup. Attempted to return call to Gianluca Arrington, and lvm to return call to this nurse. Call from patient who states that she is home alone and she is confused. States she found this number in her phone and so she's calling back. ACM introduced self and explained why there was a call. States her daughter said she doesn't have a way to come see her. Her son lives in [facility] and he wants her to come live with him but she doesn't want to, she wants to stay in her own home where she is familiar with everything. She just fixed herself some biscuits with jelly. States her grandson lives with and is always so \"crabby\". States she sees a plate on her washing machine and she doesn't know where it came from. She doesn't know when the visiting nurse came last.   She denies having any symptoms or pain. States her grandson gives her medications to her, she takes 3 pills in the morning and 1 at night. States she is going to go and lie back down in the bed. Outreach call and spoke to son Cleda Opitz, who states he has been trying to get some help for his mom, he [place] in [place] and works and he's been trying to get meals delivered and they need something form the doctor office. States he called a place for meals [phone] and they have her in their file but need something from doctor. ACM called the number above and lvm to retun call to this nurse. Encouraged patient to call nurse or the office with any questions or concerns or worsening of symptoms. CM Plan of Care:  - will await call back from Gianluca Arrington at [facility] and follow up with patient/son again next week.       Care Coordination Interventions    Referral from Primary Care Provider: No  Suggested Interventions and Community Resources  Home Health Services: Completed (Comment: Luis Shah)  Meals on Wheels: In Process  Zone Management Tools: In Process          Goals Addressed                   This Visit's Progress     Conditions and Symptoms   On track     I will schedule office visits, as directed by my provider. I will keep my appointment or reschedule if I have to cancel. I will notify my provider of any barriers to my plan of care. I will follow my Zone Management tool to seek urgent or emergent care. I will notify my provider of any symptoms that indicate a worsening of my condition. Barriers: overwhelmed by complexity of regimen and lack of education  Plan for overcoming my barriers: will work with Tracy Nevarez and Tigre Vallejo [facility] AT [facility]  Confidence: 8/10  Anticipated Goal Completion Date: 2/20/2023                Prior to Admission medications    Medication Sig Start Date End Date Taking? Authorizing Provider   ASPIRIN LOW DOSE 81 MG EC tablet take 1 tablet by mouth every morning DO NOT START BEFORE SEPTEMBER 21ST, 2022 10/14/22   Adrianna Anderson MD   donepezil (ARICEPT) 5 MG tablet Take 1 tablet by mouth nightly 10/12/22   Adrianna Anderson MD   levothyroxine (SYNTHROID) 25 MCG tablet take 1 tablet by mouth once daily 10/12/22   Adrianna Anderson MD   lisinopril-hydroCHLOROthiazide (PRINZIDE;ZESTORETIC) 10-12.5 MG per tablet Take 1 tablet by mouth daily 10/12/22   Adrianna Anderson MD       No future appointments.

## 2022-11-28 ENCOUNTER — CARE COORDINATION (OUTPATIENT)
Dept: CARE COORDINATION | Age: 87
End: 2022-11-28

## 2022-11-29 ENCOUNTER — CARE COORDINATION (OUTPATIENT)
Dept: CARE COORDINATION | Age: 87
End: 2022-11-29

## 2022-11-29 NOTE — CARE COORDINATION
Ambulatory Care Coordination Note  11/29/2022    ACC: Allyn Marie, RN  Outreach call and spoke to Lory Adair RN with [facility], states patient  is doing okay. She has not started receiving meals yet. Informed her that the son gave me a number for a place he called and pt is on file with them . ACM call to [employer] and gave referral and demos for delivered meals. [facility] states she will give the son Imani Early a call and update him. ACM will follow up in 1-2 weeks to verify delivery of meals. Care Coordination Interventions    Referral from Primary Care Provider: No  Suggested Interventions and Community Resources  Home Health Services: Completed (Comment: [facility])  Meals on Wheels: Completed (Comment: [employer])  Zone Management Tools: In Process          Goals Addressed                   This Visit's Progress     Conditions and Symptoms   On track     I will schedule office visits, as directed by my provider. I will keep my appointment or reschedule if I have to cancel. I will notify my provider of any barriers to my plan of care. I will follow my Zone Management tool to seek urgent or emergent care. I will notify my provider of any symptoms that indicate a worsening of my condition. Barriers: overwhelmed by complexity of regimen and lack of education  Plan for overcoming my barriers: will work with Indira Argueta and Julienne Soulier [facility] AT [facility]  Confidence: 8/10  Anticipated Goal Completion Date: 2/20/2023                Prior to Admission medications    Medication Sig Start Date End Date Taking?  Authorizing Provider   ASPIRIN LOW DOSE 81 MG EC tablet take 1 tablet by mouth every morning DO NOT START BEFORE SEPTEMBER 21ST, 2022 10/14/22   Noni Duenas MD   donepezil (ARICEPT) 5 MG tablet Take 1 tablet by mouth nightly 10/12/22   Noni Duenas MD   levothyroxine (SYNTHROID) 25 MCG tablet take 1 tablet by mouth once daily 10/12/22   Noni Duenas MD   lisinopril-hydroCHLOROthiazide (PRINZIDE;ZESTORETIC) 10-12.5 MG per tablet Take 1 tablet by mouth daily 10/12/22   Juju Lott MD       No future appointments.

## 2022-12-14 ENCOUNTER — CARE COORDINATION (OUTPATIENT)
Dept: CARE COORDINATION | Age: 87
End: 2022-12-14

## 2022-12-14 NOTE — CARE COORDINATION
Ambulatory Care Coordination Note  12/14/2022    ACC: Isreal Lowery RN    Sign Hx: Alzheimer disease, essential hypertension,PVC's, hypothyroidism, hearing loss, osteoarthritis. Outreach call and spoke to patient's son - Nieves Canales states patient is doing about the same. She is getting meals delivered now. States an aide came out one day and hasn't been back that he knows of. States she keeps calling EMS for things that aren't necessary like \"she can't go to the bathroom\". States the grandson that lives there isn't much help to her. States he lives in [place] and works so it's hard for him to do very much. He states that he thought PCP was going to speak with Neuro to document patient's dementia and unable to make decisions. Patient had referral to Neuro Dr. Bryce House on 5/17/2022 and no visit notes found. Call to Dr. Hnas Villalpando office and was told they do not see that patient was called or scheduled and states that right now they are not making any new patient appointment and to call back after the first of the year. Call to [facility] and [facility] for Eduardo Anderson RN CM to return call to this nurse for update. Encouraged patient to call nurse or the office with any questions or concerns or worsening of symptoms. CM Plan of Care:      - follow up with patient's son in couple weeks to follow up on help with patient at home and collaborate with [facility]. .         Care Coordination Interventions    Referral from Primary Care Provider: No  Suggested Interventions and [address]: Completed (Comment: [facility])  Meals on Wheels: Completed (Comment: [employer])  Zone Management Tools: Completed          Goals Addressed                   This Visit's Progress     Conditions and Symptoms   On track     I will schedule office visits, as directed by my provider. I will keep my appointment or reschedule if I have to cancel.   I will notify my provider of any barriers to my plan of care.  I will follow my Zone Management tool to seek urgent or emergent care. I will notify my provider of any symptoms that indicate a worsening of my condition. Barriers: overwhelmed by complexity of regimen and lack of education  Plan for overcoming my barriers: will work with Clayton Peterson and Kimberlee Dyson  Confidence: 8/10  Anticipated Goal Completion Date: 2/20/2023                Prior to Admission medications    Medication Sig Start Date End Date Taking? Authorizing Provider   ASPIRIN LOW DOSE 81 MG EC tablet take 1 tablet by mouth every morning DO NOT START BEFORE SEPTEMBER 21ST, 2022 10/14/22   Talib Dorsey MD   donepezil (ARICEPT) 5 MG tablet Take 1 tablet by mouth nightly 10/12/22   Talib Dorsey MD   levothyroxine (SYNTHROID) 25 MCG tablet take 1 tablet by mouth once daily 10/12/22   Talib Dorsey MD   lisinopril-hydroCHLOROthiazide (PRINZIDE;ZESTORETIC) 10-12.5 MG per tablet Take 1 tablet by mouth daily 10/12/22   Talib Dorsey MD       No future appointments.

## 2022-12-16 ENCOUNTER — CARE COORDINATION (OUTPATIENT)
Dept: CARE COORDINATION | Age: 87
End: 2022-12-16
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
## 2022-12-19 ENCOUNTER — CARE COORDINATION (OUTPATIENT)
Dept: CARE COORDINATION | Age: 87
End: 2022-12-19

## 2022-12-19 NOTE — TELEPHONE ENCOUNTER
Adriana Aguayo is calling to request a refill on the following medication(s):    Medication Request:  Requested Prescriptions     Pending Prescriptions Disp Refills    ASPIRIN LOW DOSE 81 MG EC tablet 90 tablet 1     Sig: take 1 tablet by mouth every morning DO NOT START BEFORE SEPTEMBER 21ST, 2022    donepezil (ARICEPT) 5 MG tablet 90 tablet 1     Sig: Take 1 tablet by mouth nightly    levothyroxine (SYNTHROID) 25 MCG tablet 90 tablet 1     Sig: take 1 tablet by mouth once daily    lisinopril-hydroCHLOROthiazide (PRINZIDE;ZESTORETIC) 10-12.5 MG per tablet 90 tablet 1     Sig: Take 1 tablet by mouth daily       Last Visit Date (If Applicable):  79/91/7270    Next Visit Date:    Visit date not found

## 2022-12-19 NOTE — CARE COORDINATION
Ambulatory Care Coordination Note  12/19/2022    ACC: Cristal Watkins, RN    Call from Jonelle Dsouza patient's son who states patient called him and is out of medications. States her script was for 30 days. He requested her refills be sent to [address] [place] [address] on [address] [address] in [place] and he will pay for them and they will deliver to patient's home. He states the home care nurse is to visit patient today. Son states he is planning to come up here after [place] or 1st of the year to try to take care of things with his mother to get some help in the home for her. ACM will request refills and follow up with [facility] and son. Attempted to contact Elia Rondon at [facility] and [facility] to return call to this nurse. No future appointments.

## 2022-12-21 RX ORDER — ASPIRIN 81 MG/1
TABLET, COATED ORAL
Qty: 90 TABLET | Refills: 1 | Status: SHIPPED | OUTPATIENT
Start: 2022-12-21

## 2022-12-21 RX ORDER — LISINOPRIL AND HYDROCHLOROTHIAZIDE 12.5; 1 MG/1; MG/1
1 TABLET ORAL DAILY
Qty: 90 TABLET | Refills: 1 | Status: SHIPPED | OUTPATIENT
Start: 2022-12-21

## 2022-12-21 RX ORDER — LEVOTHYROXINE SODIUM 0.03 MG/1
TABLET ORAL
Qty: 90 TABLET | Refills: 1 | Status: SHIPPED | OUTPATIENT
Start: 2022-12-21

## 2022-12-21 RX ORDER — DONEPEZIL HYDROCHLORIDE 5 MG/1
5 TABLET, FILM COATED ORAL NIGHTLY
Qty: 90 TABLET | Refills: 1 | Status: SHIPPED | OUTPATIENT
Start: 2022-12-21

## 2022-12-23 ENCOUNTER — CARE COORDINATION (OUTPATIENT)
Dept: CARE COORDINATION | Age: 87
End: 2022-12-23

## 2022-12-23 NOTE — CARE COORDINATION
Ambulatory Care Coordination Note  12/23/2022    ACC: Marcela Lira, RN     Attempted to contact patient for Care Management update, no answer, lvm to return call to this nurse at [phone]. If no return call, ACM will attempt to contact patient again after the holidays. No future appointments.

## 2023-01-06 ENCOUNTER — CARE COORDINATION (OUTPATIENT)
Dept: CARE COORDINATION | Age: 88
End: 2023-01-06

## 2023-01-06 NOTE — CARE COORDINATION
Ambulatory Care Coordination Note  1/6/2023    ACC: Clemencia Doran, LENO    Outreach call and spoke to patient's son Sweetie Chambers, states he has not been able to make it up here from [facility] and states things with his mother are the same. Sttes he thought Dr. Alfonzo Schafer was going to call the Neurologist and have him document that she is unable to take care of herself. He is trying to get POA. Informed him that patient has not seen the Neurologist and that the number had been given to his sister German Juarez and to the patient. He said that his sister does not get anything done for patient and to not call her. Informed him the 1st EC contact had been changed by this nurse several months ago. Son would like the number for the Neurologist so he can get appointment and patient's granddaughter will take her. Reviewed chart and patient was seen at [facility] 8/19/2021 to f/u inpt admit. 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 It was recommended diagnostice cerbral angiogram and was scheduled 9/2/2021 and it was cancelled by patient. Since she declined to have procedure done they referred to General Neuro and number to call to schedule was given to the daughter. Then Dr. Alfonzo Scahfer referred patient to  Dr. Dasia Liang and patient was never scheduled. AC called Dr. Catracho Rojas office and was told that right now they are calling patient's in order of referrals, explained that this patient was referred 5/9/2022 and was never called. Staff stated to have patient call the office on Monday that  will be there then. Call to son and informed him of the above and gave the number for Dr. Catracho Rojas office to call for appt. Encouraged patient to call nurse or the office with any questions or concerns or worsening of symptoms. CM Plan of Care:    - follow up with patient's son in 1-2 weeks to f/u Neuro appt. - update Dr. Alfonzo Schafer on the above.         Care Coordination Interventions    Referral from Primary Care Provider: No  Suggested Interventions and [address] [place] [address] Hwy: Completed (Comment: Che Ballard)  Meals on Wheels: Completed (Comment: [employer], [employer])  Zone Management Tools: Completed          Goals Addressed    None         Prior to Admission medications    Medication Sig Start Date End Date Taking? Authorizing Provider   ASPIRIN LOW DOSE 81 MG EC tablet take 1 tablet by mouth every morning DO NOT START BEFORE SEPTEMBER 21ST, 2022 12/21/22   Maribel Pate MD   donepezil (ARICEPT) 5 MG tablet Take 1 tablet by mouth nightly 12/21/22   Maribel Pate MD   levothyroxine (SYNTHROID) 25 MCG tablet take 1 tablet by mouth once daily 12/21/22   Maribel Pate MD   lisinopril-hydroCHLOROthiazide (PRINZIDE;ZESTORETIC) 10-12.5 MG per tablet Take 1 tablet by mouth daily 12/21/22   Maribel Pate MD       No future appointments.

## 2023-01-18 ENCOUNTER — CARE COORDINATION (OUTPATIENT)
Dept: CARE COORDINATION | Age: 88
End: 2023-01-18

## 2023-01-18 DIAGNOSIS — R41.3 MEMORY PROBLEM: ICD-10-CM

## 2023-01-18 DIAGNOSIS — G30.9 ALZHEIMER'S DISEASE, UNSPECIFIED (CODE) (HCC): Primary | ICD-10-CM
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
## 2023-01-18 NOTE — CARE COORDINATION
Order placed for referral to DR. Valentino Gables called and they are accepting new referrals. Office is to fax referral to [phone]. Outreach call and informed son of the referral and gave Neuro office number for him to call to schedule appointment for patient. ACM will follow up next week for neuro appt.

## 2023-01-25 ENCOUNTER — CARE COORDINATION (OUTPATIENT)
Dept: CARE COORDINATION | Age: 88
End: 2023-01-25

## 2023-01-25 NOTE — CARE COORDINATION
Ambulatory Care Coordination Note  1/25/2023    ACC: Edgar Méndez, LENO    Attempted to contact patient and no answer and no voicemail. Outreach call and spoke to son Benson Roberts who states that things with patient are not getting any better and seems to getting worse. She is calling the police all the time that she is scared and lonely. His sister was trying to stay with her but that didn't work out. His nephew is supposed to be staying there but he's hardly ever there. He has spoken to APS and they couldn't help. States she has Neuro- Dr. Temitope Guerra appointment on same day as office visit on 1/31/2023. He denies patient having any other symptoms and states she has plenty of food and stuff she needs at home. She still refuses to go to a facility and doesn't want to leave her home. Encouraged patient to call nurse or the office with any questions or concerns or worsening of symptoms. CM Plan of Care:    - follow up after neuro and PCP appointments for new orders or medications changes. Care Coordination Interventions    Referral from Primary Care Provider: No  Suggested Interventions and Community Resources  Home Health Services: Completed (Comment: Jason Mcduffie)  Meals on Wheels: Completed (Comment: [employer], [employer])  Zone Management Tools: Completed          Goals Addressed                   This Visit's Progress     Conditions and Symptoms   On track     I will schedule office visits, as directed by my provider. I will keep my appointment or reschedule if I have to cancel. I will notify my provider of any barriers to my plan of care. I will follow my Zone Management tool to seek urgent or emergent care. I will notify my provider of any symptoms that indicate a worsening of my condition.     Barriers: overwhelmed by complexity of regimen and lack of education  Plan for overcoming my barriers: will work with Meredith Payan and Isabella Salgado 78  Confidence: 8/10  Anticipated Goal Completion Date: 2/20/2023 Prior to Admission medications    Medication Sig Start Date End Date Taking?  Authorizing Provider   ASPIRIN LOW DOSE 81 MG EC tablet take 1 tablet by mouth every morning DO NOT START BEFORE SEPTEMBER 21ST, 2022 12/21/22   Thais Morgan MD   donepezil (ARICEPT) 5 MG tablet Take 1 tablet by mouth nightly 12/21/22   Thais Morgan MD   levothyroxine (SYNTHROID) 25 MCG tablet take 1 tablet by mouth once daily 12/21/22   Thais Morgan MD   lisinopril-hydroCHLOROthiazide (PRINZIDE;ZESTORETIC) 10-12.5 MG per tablet Take 1 tablet by mouth daily 12/21/22   Thais Morgan MD       Future Appointments   Date Time Provider Jade Keller   1/31/2023 11:45 AM Thais Morgan MD [facility] Miguel Cedeño

## 2023-01-31 ENCOUNTER — OFFICE VISIT (OUTPATIENT)
Dept: INTERNAL MEDICINE CLINIC | Age: 88
End: 2023-01-31
Payer: MEDICARE

## 2023-01-31 VITALS
SYSTOLIC BLOOD PRESSURE: 100 MMHG | BODY MASS INDEX: 23.22 KG/M2 | WEIGHT: 136 LBS | HEART RATE: 66 BPM | TEMPERATURE: 97.3 F | HEIGHT: 64 IN | DIASTOLIC BLOOD PRESSURE: 70 MMHG

## 2023-01-31 DIAGNOSIS — E03.9 HYPOTHYROIDISM, UNSPECIFIED TYPE: ICD-10-CM

## 2023-01-31 DIAGNOSIS — G30.9 ALZHEIMER'S DISEASE, UNSPECIFIED (CODE) (HCC): ICD-10-CM

## 2023-01-31 DIAGNOSIS — R63.4 WEIGHT LOSS: Primary | ICD-10-CM

## 2023-01-31 DIAGNOSIS — I10 ESSENTIAL HYPERTENSION: ICD-10-CM

## 2023-01-31 DIAGNOSIS — R41.3 MEMORY PROBLEM: ICD-10-CM

## 2023-01-31 LAB
ALBUMIN SERPL-MCNC: 4.4 G/DL
ALP BLD-CCNC: 89 U/L
ALT SERPL-CCNC: 17 U/L
ANION GAP SERPL CALCULATED.3IONS-SCNC: NORMAL MMOL/L
AST SERPL-CCNC: 26 U/L
BILIRUB SERPL-MCNC: 1.2 MG/DL (ref 0.1–1.4)
BUN BLDV-MCNC: 17 MG/DL
CALCIUM SERPL-MCNC: 10 MG/DL
CHLORIDE BLD-SCNC: 103 MMOL/L
CO2: 25 MMOL/L
CREAT SERPL-MCNC: 0.9 MG/DL
EGFR: 59.2
GLUCOSE BLD-MCNC: 103 MG/DL
POTASSIUM SERPL-SCNC: 4.1 MMOL/L
SODIUM BLD-SCNC: 140 MMOL/L
TOTAL PROTEIN: 7.4

## 2023-01-31 PROCEDURE — G8420 CALC BMI NORM PARAMETERS: HCPCS | Performed by: INTERNAL MEDICINE

## 2023-01-31 PROCEDURE — G8484 FLU IMMUNIZE NO ADMIN: HCPCS | Performed by: INTERNAL MEDICINE

## 2023-01-31 PROCEDURE — 1090F PRES/ABSN URINE INCON ASSESS: CPT | Performed by: INTERNAL MEDICINE

## 2023-01-31 PROCEDURE — 99214 OFFICE O/P EST MOD 30 MIN: CPT | Performed by: INTERNAL MEDICINE

## 2023-01-31 PROCEDURE — 1123F ACP DISCUSS/DSCN MKR DOCD: CPT | Performed by: INTERNAL MEDICINE

## 2023-01-31 PROCEDURE — 1036F TOBACCO NON-USER: CPT | Performed by: INTERNAL MEDICINE

## 2023-01-31 PROCEDURE — G8427 DOCREV CUR MEDS BY ELIG CLIN: HCPCS | Performed by: INTERNAL MEDICINE

## 2023-01-31 PROCEDURE — G0444 DEPRESSION SCREEN ANNUAL: HCPCS | Performed by: INTERNAL MEDICINE

## 2023-01-31 RX ORDER — MEMANTINE HYDROCHLORIDE 10 MG/1
10 TABLET ORAL 2 TIMES DAILY
COMMUNITY

## 2023-01-31 ASSESSMENT — PATIENT HEALTH QUESTIONNAIRE - PHQ9
SUM OF ALL RESPONSES TO PHQ9 QUESTIONS 1 & 2: 0
SUM OF ALL RESPONSES TO PHQ QUESTIONS 1-9: 0
2. FEELING DOWN, DEPRESSED OR HOPELESS: 0
1. LITTLE INTEREST OR PLEASURE IN DOING THINGS: 0
SUM OF ALL RESPONSES TO PHQ QUESTIONS 1-9: 0

## 2023-01-31 NOTE — PROGRESS NOTES
Nedra Sow is a 80 y.o. female who presents for   Chief Complaint   Patient presents with    Health Maintenance     Flu, covid, awv, shingles, tdap    Memory Loss     Follow up; patient saw Neurology today Dr Wali Betancourt    and follow up of chronic medical problems. Patient Active Problem List   Diagnosis    HBP (high blood pressure)    Constitutional obesity    Hypothyroidism    Shortness of breath    Alzheimer's disease, unspecified     HPI  Here for follow-up from Dr. Sharon Rutledge office neurology regarding her memory issues and denies any complaints and patient states she is feeling good        Current Outpatient Medications   Medication Sig Dispense Refill    memantine (NAMENDA) 10 MG tablet Take 10 mg by mouth 2 times daily 1 tab daily for 10 days then 1 tab 2 times daily thereafter      Nutritional Supplements (ENSURE COMPLETE) LIQD Take 1 each by mouth daily 30 each 0    ASPIRIN LOW DOSE 81 MG EC tablet take 1 tablet by mouth every morning DO NOT START BEFORE SEPTEMBER 21ST, 2022 90 tablet 1    donepezil (ARICEPT) 5 MG tablet Take 1 tablet by mouth nightly 90 tablet 1    lisinopril-hydroCHLOROthiazide (PRINZIDE;ZESTORETIC) 10-12.5 MG per tablet Take 1 tablet by mouth daily 90 tablet 1    levothyroxine (SYNTHROID) 25 MCG tablet take 1 tablet by mouth once daily 90 tablet 1     No current facility-administered medications for this visit. Allergies   Allergen Reactions    Codeine        Past Medical History:   Diagnosis Date    HBP (high blood pressure)     Hematuria 4-21-16    Patient had a complete workup in the past    Hyperglycemia        No past surgical history on file.     Family History   Problem Relation Age of Onset    High Blood Pressure Mother     High Blood Pressure Father     Asthma Brother     Cancer Maternal Uncle         colon     ROS  Constitutional:  Negative for fatigue, loss of appetite and unexpected weight change  HEENT            : Negative for neck stiffness and pain, no congestion or sinus pressure  Eyes                : No visual disturbance or pain  Cardiovascular: No chest pain or palpitations or leg swelling  Respiratory      : Negative for cough, shortness of breath or wheezing  Gastrointestinal: Negative for abdominal pain, constipation or diarrhea and bloating No nausea or vomiting  Genitourinary:     No urgency or frequency, no burning or hematuria  Musculoskeletal: No arthralgias, back pain or myalgias  Skin                  : Negative for rash or erythema  Neurological    : Negative for dizziness, weakness, tremors ,light headedness or syncope  Psychiatric       : Negative for dysphoric mood, sleep disturbances, nervous or anxious, or decreased concentration  All other review of systems was negative    Objective  Physical Examination:    Nursing note reviewed    /70 (Site: Left Upper Arm, Position: Sitting, Cuff Size: Medium Adult)   Pulse 66   Temp 97.3 °F (36.3 °C) (Temporal)   Ht 5' 4.02\" (1.626 m)   Wt 136 lb (61.7 kg)   BMI 23.33 kg/m²   BP Readings from Last 3 Encounters:   01/31/23 100/70   11/15/22 120/78   10/12/22 130/82         Constitutional:  Sarah Camargo is oriented to place, person and time ,appears well-developed and well-nourished  HEENT:  Atraumatic and normocephalic, external ears normal bilaterally, nose normal no oropharyngeal exudate and is clear and moist  Eyes:  EOCM normal; conjunctivae normal; PERRLA bilaterally  Neck:  Normal range of motion, neck supple, no JVD and no thyromegaly  Cardiovascular:  RRR, normal heart sounds and intact distal pulses  Pulmonary:  effort normal and breath sounds normal bilaterally,no wheezes or rales, no respiratory distress  Abdominal:  Soft, non-tender; normal bowel sounds, no masses  Musculoskeletal:  Normal range of motion and no edema or tenderness bilaterally  No lymphadenopathy  Neurological:  alert, oriented, and normal reflexes bilaterally  Skin: warm and dry  Psychiatric:  normal mood and effect; behavior normal.    Labs:   Lab Results   Component Value Date    LABA1C 5.6 03/08/2018     Lab Results   Component Value Date    CHOL 140 03/08/2018     Lab Results   Component Value Date    HDL 61 03/08/2018     Lab Results   Component Value Date    LDLCALC 64 03/08/2018     Lab Results   Component Value Date    TRIG 73 03/08/2018     No results found for: [place], [place]  Lab Results   Component Value Date    WBC 7.3 06/10/2019    HGB 14.0 06/10/2019    HCT 41.2 06/10/2019    MCV 92 06/10/2019     06/10/2019     No results found for: INR, PROTIME  Lab Results   Component Value Date    GLUCOSE 92 06/10/2019    CREATININE 0.87 12/14/2021    BUN 14 06/10/2019     06/10/2019    K 4.4 06/10/2019     06/10/2019    CO2 28 06/10/2019     Lab Results   Component Value Date    ALT 14 06/10/2019    AST 21 06/10/2019    ALKPHOS 74 06/10/2019    BILITOT 0.7 06/10/2019     Lab Results   Component Value Date    LABALBU 4.0 06/10/2019     Lab Results   Component Value Date    TSH 3.01 06/10/2019     Assessment:  1. Weight loss    2. Memory problem    3. Alzheimer's disease, unspecified (CODE) ([facility]    4. Essential hypertension    5.  Hypothyroidism, unspecified type        Plan:  Patient lost another 10 pounds since the last visit and patient says she is getting up food from Meals on Wheels and feeling hungry always and so advised patient to try Ensure once a day and a prescription was given  Patient was also referred to GI back in October and patient's caregiver states she never got a call from them and so a new referral was sent with urgent category  Patient's son works in [employer] and wants to come here to help her but needs a letter stating that patient is mentally incapable of ending her if it and I advised the caregiver to check with neurology and get a letter to that effect  Patient had some lab work done today including CMP TSH and will get a copy of the report from [facility] and reports from Dr. Ian Zepeda neurologist visit reviewed  Blood pressure is stable on current medications  Patient is on Aricept and Namenda for her memory issues  Review in 3 months           1. Shasta received counseling on the following healthy behaviors: nutrition and exercise    2. Prior labs and health maintenance reviewed. 3.  Discussed use, benefit, and side effects of prescribed medications. Barriers to medication compliance addressed. All her questions were answered. Pt voiced understanding. Lilli Hooper will continue current medications, diet and exercise. Orders Placed This Encounter   Medications    Nutritional Supplements (ENSURE COMPLETE) LIQD     Sig: Take 1 each by mouth daily     Dispense:  30 each     Refill:  0          Completed Refills               Requested Prescriptions     Signed Prescriptions Disp Refills    Nutritional Supplements (ENSURE COMPLETE) LIQD 30 each 0     Sig: Take 1 each by mouth daily     4. Patient given educational materials - see patient instructions    5. Was a self-tracking handout given in paper form or via [employer]? NO    Orders Placed This Encounter   Procedures    Tong Saenz MD, Gastroenterology, [facility]     Referral Priority:   Urgent     Referral Type:   Eval and Treat     Referral Reason:   Specialty Services Required     Referred to Provider:   Xavier Stephenson MD     Requested Specialty:   Gastroenterology     Number of Visits Requested:   1     Return in about 3 months (around 4/30/2023). Patient voiced understanding and agreed to treatment plan. Electronically signed by Veronica Burch MD on 1/31/2023 at 12:29 PM    This note is created with a voice recognition program and while intend to generate a document that accurately reflects the content of the visit, no guarantee can be provided that every mistake has been identified and corrected by editing.

## 2023-02-06 ENCOUNTER — CARE COORDINATION (OUTPATIENT)
Dept: CARE COORDINATION | Age: 88
End: 2023-02-06

## 2023-02-06 NOTE — CARE COORDINATION
Ambulatory Care Coordination Note  2/7/2023    ACC: Emmalene Lesch, RN  Outreach call and spoke to patient's son who states that patient's memory is getting worse. She can't remember what she last ate or even on the phone she forgets who she is speaking to. States she saw Dr. Cruz Shall on 2/6/23 or had some test done, he thinks MRI. States he has to wait till patient has testing done for Dr. Cruz Shall to make his diagnosis so he can get paperwork to start get POA or go to court to say she is incompetent before he can do anything. States he is trying to come up at the end of the month. States the GI hasn't called for appointment yet and they need to call him or his daughter because she will be the one taking her. His daughter is going there everyday to make sure patient is eating and taking medications. States the pharmacy can't get her Ensure because they don't carry it. ACM will send to "[employer]" to see if they can deliver Ensure to patient. ACM will follow up next on Ensure order. Care Coordination Interventions    Referral from Primary Care Provider: No  Suggested Interventions and Community Resources  Home Health Services: Completed (Comment: Laura Espinoza)  Meals on Wheels: Completed (Comment: [employer])  Zone Management Tools: Completed          Goals Addressed                   This Visit's Progress     Conditions and Symptoms   On track     I will schedule office visits, as directed by my provider. I will keep my appointment or reschedule if I have to cancel. I will notify my provider of any barriers to my plan of care. I will follow my Zone Management tool to seek urgent or emergent care. I will notify my provider of any symptoms that indicate a worsening of my condition.     Barriers: overwhelmed by complexity of regimen and lack of education  Plan for overcoming my barriers: will work with [employer] and Narinder Dyson  Confidence: 8/10  Anticipated Goal Completion Date: 2/20/2023 Prior to Admission medications    Medication Sig Start Date End Date Taking?  Authorizing Provider   memantine (NAMENDA) 10 MG tablet Take 10 mg by mouth 2 times daily 1 tab daily for 10 days then 1 tab 2 times daily thereafter    Historical Provider, MD   Nutritional Supplements (ENSURE COMPLETE) LIQD Take 1 each by mouth daily 1/31/23   Oliva Galan MD   ASPIRIN LOW DOSE 81 MG EC tablet take 1 tablet by mouth every morning DO NOT START BEFORE SEPTEMBER 21ST, 2022 12/21/22   Oliva Galan MD   donepezil (ARICEPT) 5 MG tablet Take 1 tablet by mouth nightly 12/21/22   Oliva Galan MD   levothyroxine (SYNTHROID) 25 MCG tablet take 1 tablet by mouth once daily 12/21/22   Oliva Galan MD   lisinopril-hydroCHLOROthiazide (PRINZIDE;ZESTORETIC) 10-12.5 MG per tablet Take 1 tablet by mouth daily 12/21/22   Oliva Galan MD       Future Appointments   Date Time Provider Jade Keller   5/1/2023 11:45 AM Oliva Galan MD [facility]

## 2023-02-07 SDOH — ECONOMIC STABILITY: INCOME INSECURITY: IN THE LAST 12 MONTHS, WAS THERE A TIME WHEN YOU WERE NOT ABLE TO PAY THE MORTGAGE OR RENT ON TIME?: NO

## 2023-02-07 SDOH — ECONOMIC STABILITY: HOUSING INSECURITY: IN THE LAST 12 MONTHS, HOW MANY PLACES HAVE YOU LIVED?: 1

## 2023-02-07 ASSESSMENT — LIFESTYLE VARIABLES
HOW OFTEN DO YOU HAVE A DRINK CONTAINING ALCOHOL: NEVER
HOW MANY STANDARD DRINKS CONTAINING ALCOHOL DO YOU HAVE ON A TYPICAL DAY: PATIENT DOES NOT DRINK

## 2023-02-07 NOTE — CARE COORDINATION
[facility]. Spoke to Pearl who states they are currently not supplying any oral nutritional supplements because of the National back order. Outreach call and spoke to son Yolanda Armstrong and informed that they will have to look on the shelves at [employer], etc for ensure or boost supplements for patient that company's aren't supplying these supplements currently due to shortage. States he will have his daughter look for some.

## 2023-02-22 ENCOUNTER — CARE COORDINATION (OUTPATIENT)
Dept: CARE COORDINATION | Age: 88
End: 2023-02-22

## 2023-02-22 ASSESSMENT — SOCIAL DETERMINANTS OF HEALTH (SDOH)
HOW OFTEN DO YOU ATTEND CHURCH OR RELIGIOUS SERVICES?: NEVER
DO YOU BELONG TO ANY CLUBS OR ORGANIZATIONS SUCH AS CHURCH GROUPS UNIONS, FRATERNAL OR ATHLETIC GROUPS, OR SCHOOL GROUPS?: YES
HOW OFTEN DO YOU ATTENT MEETINGS OF THE CLUB OR ORGANIZATION YOU BELONG TO?: NEVER
IN A TYPICAL WEEK, HOW MANY TIMES DO YOU TALK ON THE PHONE WITH FAMILY, FRIENDS, OR NEIGHBORS?: MORE THAN THREE TIMES A WEEK
HOW OFTEN DO YOU GET TOGETHER WITH FRIENDS OR RELATIVES?: TWICE A WEEK

## 2023-02-22 NOTE — CARE COORDINATION
Ambulatory Care Coordination Note  2023    Patient Current Location:  Home: [address] [place], [place] [address] [place] [address]     ACM contacted the family by telephone. Verified name and  with family as identifiers. Provided introduction to self, and explanation of the ACM role. Challenges to be reviewed by the provider   Additional needs identified to be addressed with provider: No  none               Method of communication with provider: none. ACM: Evelia Corbett RN    Outreach call and spoke to patient's son Hernandez Loomis, states she is doing about the same. She is getting meals delivered and his daughter is picking up  groceries for her and did get some nutritional shakes for her. Discussed with him about her losing another 10 lbs since Nov. And that referral had been sent to GI - Dr. Jose Enrique Negrete for weight loss. States she is scheduled Friday 3/3/2023 for MRI that Dr. Joya Damian ordered and then he referred her back to Dr. Bethany Archuleta and she will see him on 3/21. He is waiting to get MRI and follow up Neuro appointment to get something in writing that she can't take care of herself and get her placed. Encouraged patient to call nurse or the office with any questions or concerns or worsening of symptoms. CM Plan of Care:    - follow up with patient's son in 2-3 weeks to f/u on MRI, Neuro and GI appt. Care Coordination Interventions    Referral from Primary Care Provider: No  Suggested Interventions and Community Resources  Home Health Services: Completed (Comment: Cynthia Mitchell)  Meals on Wheels: Completed (Comment: [employer], [employer])  Zone Management Tools: Completed          Goals Addressed                   This Visit's Progress     Conditions and Symptoms   On track     I will schedule office visits, as directed by my provider. I will keep my appointment or reschedule if I have to cancel. I will notify my provider of any barriers to my plan of care.   I will follow my Zone Management tool to seek urgent or emergent care. I will notify my provider of any symptoms that indicate a worsening of my condition.     Barriers: overwhelmed by complexity of regimen and lack of education  Plan for overcoming my barriers: will work with [email or web address] and [facility]  Confidence: 8/10  Anticipated Goal Completion Date: 2/20/2023                Future Appointments   Date Time Provider Jade Keller   3/21/2023 10:20 AM Ramses Moore MD Neuro Spec Neurology -   5/1/2023 11:45 AM Rhonda Acuña MD [facility]

## 2023-03-02 ENCOUNTER — CARE COORDINATION (OUTPATIENT)
Dept: CARE COORDINATION | Age: 88
End: 2023-03-02

## 2023-03-02 NOTE — CARE COORDINATION
Ambulatory Care Coordination Note  3/2/2023    Patient Current Location:  Home: [facility] [place]  [place] [address]     ACM contacted the family by telephone. Verified name and  with family as identifiers. Provided introduction to self, and explanation of the ACM role. Challenges to be reviewed by the provider   Additional needs identified to be addressed with provider: No  none               Method of communication with provider: none. ACM: Cyn Anna RN    call from patient's son Ethan Bravo, stating that patient is not doing any better and she can't remember anything from 5 minutes ago. She did go get MRI done for Neuro and has a follow up appointment on 3/21/2023. His daughter is trying to do as much as she can by shopping and going over there all the time but it's not enough. She needs someone to check on her daily. States it's going to be couple weeks before he can come up from [place]. Son inquired if there are any agencies available that could send someone daily to check on patient. States the visiting nurse has stopped coming. Informed him he can call [employer] and inquire, gave him the number to call. Son gave nurse his email ([email or web address]) to send some information for [employer] and aide services. Offered patient enrollment in the Remote Patient Monitoring (RPM) program for in-home monitoring: Patient is not eligible for RPM program.    Encouraged patient to call nurse or the office with any questions or concerns or worsening of symptoms. CM Plan of Care:    - will send info for [employer] and agencies with aide services to son's email.     - follow up with patient's son in 1-2 weeks to f/u on getting an aide in home.      Care Coordination Interventions    Referral from Primary Care Provider: No  Suggested Interventions and Community Resources  Home Health Services: Completed (Comment: Bibiana Pérez)  Meals on Wheels: Completed (Comment: [employer])  Zone Management Tools: Completed          Goals Addressed                   This Visit's Progress     Conditions and Symptoms   On track     I will schedule office visits, as directed by my provider. I will keep my appointment or reschedule if I have to cancel. I will notify my provider of any barriers to my plan of care. I will follow my Zone Management tool to seek urgent or emergent care. I will notify my provider of any symptoms that indicate a worsening of my condition.     Barriers: overwhelmed by complexity of regimen and lack of education  Plan for overcoming my barriers: will work with [address]  Confidence: 8/10  Anticipated Goal Completion Date: 2/20/2023                Future Appointments   Date Time Provider Jade Keller   3/21/2023 10:20 AM Mari Echols MD Neuro Spec Neurology -   5/1/2023 11:45 AM Seth Florian MD [facility] Lupe Alexander

## 2023-03-09 ENCOUNTER — CARE COORDINATION (OUTPATIENT)
Dept: CARE COORDINATION | Age: 88
End: 2023-03-09

## 2023-03-09 SDOH — HEALTH STABILITY: PHYSICAL HEALTH: ON AVERAGE, HOW MANY MINUTES DO YOU ENGAGE IN EXERCISE AT THIS LEVEL?: 0 MIN

## 2023-03-09 SDOH — HEALTH STABILITY: PHYSICAL HEALTH: ON AVERAGE, HOW MANY DAYS PER WEEK DO YOU ENGAGE IN MODERATE TO STRENUOUS EXERCISE (LIKE A BRISK WALK)?: 0 DAYS

## 2023-03-09 NOTE — CARE COORDINATION
Ambulatory Care Coordination Note  3/9/2023    Patient Current Location:  Home: [address] [place] [address]     ACM contacted the family by telephone. Verified name and  with family as identifiers. Provided introduction to self, and explanation of the ACM role. Challenges to be reviewed by the provider   Additional needs identified to be addressed with provider: No  none               Method of communication with provider: chart routing. ACM: Marcelle Babcock RN    Call from Ashley patient's son, [facility] patient went to Neurology office yesterday to follow up on MRI. 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 He states he needs something written up that she is in capable of taking care of herself. Informed him that MRI and office notes from [place] office are not available in chart, will call to request notes and MRI be faxed to the office. [facility] she went and wrote out another $1000 check for his nephew, that won't do a thing for her. Informed Ashley that at patient's last visit, he advised the caregiver to check with neurologist about getting a letter stating she is incapable of taking care of self. States he will call their office. CM Plan of Care:    - will call Neuro office to request visit notes and MRI be faxed to office and follow up with son in 1-2 weeks. Offered patient enrollment in the Remote Patient Monitoring (RPM) program for in-home monitoring:  patient not capable to use equipment . Care Coordination Interventions    Referral from Primary Care Provider: No  Suggested Interventions and Community Resources  Home Health Services: Completed (Comment: Darlene Stands)  Meals on Wheels: Completed (Comment: [employer], [employer])  Zone Management Tools: Completed          Goals Addressed                   This Visit's Progress     Conditions and Symptoms   On track     I will schedule office visits, as directed by my provider.   I will keep my appointment or reschedule if I have to cancel. I will notify my provider of any barriers to my plan of care. I will follow my Zone Management tool to seek urgent or emergent care. I will notify my provider of any symptoms that indicate a worsening of my condition. Barriers: overwhelmed by complexity of regimen and lack of education  Plan for overcoming my barriers: will work with [employer] and Isabella Dyson  Confidence: 8/10  Anticipated Goal Completion Date: 2/20/2023              Prior to Admission medications    Medication Sig Start Date End Date Taking?  Authorizing Provider   memantine (NAMENDA) 10 MG tablet Take 10 mg by mouth 2 times daily 1 tab daily for 10 days then 1 tab 2 times daily thereafter    Historical Provider, MD   Nutritional Supplements (ENSURE COMPLETE) LIQD Take 1 each by mouth daily 1/31/23   Domenico Gibson MD   ASPIRIN LOW DOSE 81 MG EC tablet take 1 tablet by mouth every morning DO NOT START BEFORE SEPTEMBER 21ST, 2022 12/21/22   Domenico Gibson MD   donepezil (ARICEPT) 5 MG tablet Take 1 tablet by mouth nightly 12/21/22   Domenico Gibson MD   levothyroxine (SYNTHROID) 25 MCG tablet take 1 tablet by mouth once daily 12/21/22   Domenico Gibson MD   lisinopril-hydroCHLOROthiazide (PRINZIDE;ZESTORETIC) 10-12.5 MG per tablet Take 1 tablet by mouth daily 12/21/22   Domenico Gibson MD         Future Appointments   Date Time Provider Jade Keller   3/21/2023 10:20 AM Nikolay Nagel MD Neuro Spec Neurology -   5/1/2023 11:45 AM Domenico Gibson MD [facility] [place]

## 2023-03-24 ENCOUNTER — CARE COORDINATION (OUTPATIENT)
Dept: CARE COORDINATION | Age: 88
End: 2023-03-24

## 2023-03-30 ENCOUNTER — CARE COORDINATION (OUTPATIENT)
Dept: CARE COORDINATION | Age: 88
End: 2023-03-30

## 2023-03-30 DIAGNOSIS — I10 ESSENTIAL HYPERTENSION: ICD-10-CM

## 2023-03-30 DIAGNOSIS — G30.9 ALZHEIMER'S DISEASE, UNSPECIFIED (CODE) (HCC): Primary | ICD-10-CM

## 2023-03-30 DIAGNOSIS — Z91.81 AT HIGH RISK FOR FALLS: ICD-10-CM

## 2023-03-30 NOTE — CARE COORDINATION
Call from patient's son stating that his sister called and told him that patient is not able to give herself bathes and that today she really needs one. He has talked to some friend of his who told him that he can call [employer] and gave him the names of couple agencies in the area that take Medicare. He tried calling [employer] and unable to get hold of anyone. He said one of the agencies is [employer] and asked if nurse would call them. ACM will reach out to [employer] and get back with son. Outreach call to [facility] and spoke to Eduardo who states they can provide aide services to patient area and they have a 4 hour minimum at $ 29.95 /hour. She request to send order with demos to them and they will reach out to the son. Return call to Marva Oneill and informed him of the above, he request that referrals also be sent to [employer] and [email or web address]. Spoke to Salvador at "[employer] for [employer] who states they require a 4 hour minimum or around $500/month. Call from son who wants a referral for home care sent to [facility] again for medication concerns, falls and needing aide service. ACM will send referral to [facility] and make outreach to verify services and follow up next week.     Future Appointments   Date Time Provider Jade Keller   5/1/2023 11:45 AM Scott Garrison MD [facility] Hina Daley

## 2023-04-07 NOTE — CARE COORDINATION
Ambulatory Care Coordination Note  3/24/2023    Patient Current Location:  Home: [address] [place] [address] [place] [address]     ACM contacted the patient by telephone. Verified name and  with patient as identifiers. Provided introduction to self, and explanation of the ACM role. Challenges to be reviewed by the provider   Additional needs identified to be addressed with provider: No  none               Method of communication with provider: none. ACM: Kristie Balbuena, RN    spoke to patient's son Srini Marcial who states that things are the same with his mom. States his daughter goes to check on her and takes her food. She is still getting meals delivered. His  has reached out to the Neuro Dr. Yahir Mims for a letter stating that patient is incapable of taking care of self in order to obtain guardianship. States that it seems he can't do anything for his mom unless he moves up here and then he has to be here 6 months. States he will continue to try to make sure patient has what she needs and taken care of and he will call if there is anything he needs from office. Encouraged patient to call nurse or the office with any questions or concerns or worsening of symptoms. CM Plan of Care:    - follow up with patient in 3-4 weeks to evaluate readiness for graduation. Offered patient enrollment in the Remote Patient Monitoring (RPM) program for in-home monitoring: NA.    Care Coordination Interventions    Referral from Primary Care Provider: No  Suggested Interventions and [address]: Completed (Comment: Elkin Ha)  Meals on Wheels: Completed (Comment: [employer], [employer])  Zone Management Tools: Completed          Goals Addressed                   This Visit's Progress     Conditions and Symptoms   On track     I will schedule office visits, as directed by my provider. I will keep my appointment or reschedule if I have to cancel.   I will notify my provider of any barriers to my
n/a

## 2023-05-31 ENCOUNTER — TELEPHONE (OUTPATIENT)
Dept: GASTROENTEROLOGY | Age: 88
End: 2023-05-31

## 2023-05-31 NOTE — TELEPHONE ENCOUNTER
1st attempt: Writer reached out to patient to schedule for colon procedure with [facility], patient stated she did not want to schedule and hung up phone on writer. Writer will close referral per phone call with patient.

## 2023-07-05 ENCOUNTER — TELEPHONE (OUTPATIENT)
Dept: INTERNAL MEDICINE CLINIC | Age: 88
End: 2023-07-05

## 2023-07-05 NOTE — TELEPHONE ENCOUNTER
Pt discharged from hospital and [facility]. Ok to follow for home health?

## 2023-07-06 RX ORDER — LEVOTHYROXINE SODIUM 0.03 MG/1
TABLET ORAL
Qty: 90 TABLET | Refills: 1 | Status: SHIPPED | OUTPATIENT
Start: 2023-07-06

## 2023-07-06 RX ORDER — MEMANTINE HYDROCHLORIDE 10 MG/1
10 TABLET ORAL 2 TIMES DAILY
Qty: 180 TABLET | Refills: 1 | Status: SHIPPED | OUTPATIENT
Start: 2023-07-06

## 2023-07-06 RX ORDER — RISPERIDONE 0.25 MG/1
0.25 TABLET ORAL 2 TIMES DAILY
Qty: 180 TABLET | Refills: 1 | Status: SHIPPED | OUTPATIENT
Start: 2023-07-06

## 2023-07-06 RX ORDER — DONEPEZIL HYDROCHLORIDE 5 MG/1
5 TABLET, FILM COATED ORAL NIGHTLY
Qty: 90 TABLET | Refills: 1 | Status: SHIPPED | OUTPATIENT
Start: 2023-07-06

## 2023-07-06 RX ORDER — LISINOPRIL AND HYDROCHLOROTHIAZIDE 12.5; 1 MG/1; MG/1
1 TABLET ORAL DAILY
Qty: 90 TABLET | Refills: 1 | Status: SHIPPED | OUTPATIENT
Start: 2023-07-06

## 2023-07-06 NOTE — TELEPHONE ENCOUNTER
Nurse from [facility] called, states patient needs refills on all meds she was d/c with.  Orders pending

## 2023-07-07 ENCOUNTER — TELEPHONE (OUTPATIENT)
Dept: INTERNAL MEDICINE CLINIC | Age: 88
End: 2023-07-07

## 2023-07-07 NOTE — TELEPHONE ENCOUNTER
Care Transitions Initial Follow Up Call    Outreach made within 2 business days of discharge: Yes    Patient: Dashawn Mullen Patient : 1935   MRN: [id number]  Reason for Admission: No discharge information exists for this patient. Discharge Date:  2023       Spoke with: Etienne Lang     Discharge department/facility: [facility] Interactive Patient Contact:  Was patient able to fill all prescriptions: Yes  Was patient instructed to bring all medications to the follow-up visit: Yes  Is patient taking all medications as directed in the discharge summary?  Yes  Does patient understand their discharge instructions: Yes  Does patient have questions or concerns that need addressed prior to 7-14 day follow up office visit: no    Scheduled appointment with PCP within 7-14 days    Follow Up  Future Appointments   Date Time Provider [address] [place] [address]   2023 10:00 AM Rosaura Menchaca MD [facility] [address] [place], [place]

## 2023-07-12 ENCOUNTER — TELEMEDICINE (OUTPATIENT)
Dept: INTERNAL MEDICINE CLINIC | Age: 88
End: 2023-07-12
Payer: MEDICARE

## 2023-07-12 DIAGNOSIS — F01.518 VASCULAR DEMENTIA WITH BEHAVIORAL DISTURBANCE (HCC): ICD-10-CM

## 2023-07-12 DIAGNOSIS — E03.9 HYPOTHYROIDISM, UNSPECIFIED TYPE: ICD-10-CM

## 2023-07-12 DIAGNOSIS — R41.3 MEMORY PROBLEM: Primary | ICD-10-CM

## 2023-07-12 PROCEDURE — 99443 PR PHYS/QHP TELEPHONE EVALUATION 21-30 MIN: CPT | Performed by: INTERNAL MEDICINE

## 2023-07-18 PROBLEM — F01.518 VASCULAR DEMENTIA WITH BEHAVIORAL DISTURBANCE (HCC): Status: ACTIVE | Noted: 2023-07-18

## 2023-07-18 NOTE — PROGRESS NOTES
Dalia Phipps is a 80 y.o. female evaluated via telephone on 7/12/2023 for Health Maintenance (Tdap, shingles, awv, covid), Discuss Medications Rhea El patient daughter will call to update medication when she takes patient home), and Follow-Up from Hospital (Patient was seen at [facility])  . Documentation:  I communicated with the patient and/or health care decision maker about patient's follow-up from the hospital admission for possible memory problems and dementia denies any new complaints  Details of this discussion including any medical advice provided: Reports from the hospital reviewed and medications reviewed and patient advised to follow-up with neurology and a referral made and advised staff to make an appointment with Dr. Kathy Fisher whom she has seen in the past  Review as scheduled    Total Time: minutes: 21-30 minutes    Dalia Phipps was evaluated through a synchronous (real-time) audio encounter. Patient identification was verified at the start of the visit. She (or guardian if applicable) is aware that this is a billable service, which includes applicable co-pays. This visit was conducted with the patient's (and/or legal guardian's) verbal consent. She has not had a related appointment within my department in the past 7 days or scheduled within the next 24 hours. The patient was located at [facility] ([address] [place] [address]): [address] [place] [address],  [address] [place] [address],[address]. The provider was located at [facility] (Appt Dept): [address] [place] [address],  [address] [place] [address],[address].     Note: not billable if this call serves to triage the patient into an appointment for the relevant concern    Erasmo Amin MD

## 2023-08-17 ENCOUNTER — CARE COORDINATION (OUTPATIENT)
Dept: CARE COORDINATION | Age: 88
End: 2023-08-17

## 2023-08-17 NOTE — CARE COORDINATION
Spoke to patient's son Olivia Murcia who states he has patient's medications : donepezil, levothyroxine, lisinopril - HTZ, and asa. Informed him that all her medications were refilled on 7/6/2023 and sent to [facility] on [address] [facility]. He does not have risperidone 0.25 mg, he is going to call [facility] to see if script was filled. States he will call back if they did not fill them.

## 2023-08-17 NOTE — CARE COORDINATION
Call from patient's son Lorri Salazar, asking if there is something to give his mother to help her sleep at night.states she is in [place] living with him now and he's trying to take care of her and get her setup with a ne doctor there. States she is up all through the night getting in the cupboards and stuff and waking him up. States he needs some help. He states her Neurologist - Dr. Kishore Ferreira has left his office. No future appointments.

## 2023-08-18 NOTE — CARE COORDINATION
Outreach call to son to follow up on Risperidone script, he states he called [facility] and got script transferred to local [address] [place] [address] and it's supposed to be ready today after 2 pm.   He states she had a fall this morning and cut her head and he took her to the ED and they stitched it up and did a CT scan. He states he is hoping this medicine will help her sleep at night and to help her with her pacing around the house. He states he has a new PCP to call to get her started with care down there and he hasn't had a chance to call yet today. States he will have all her medications when he picks the risperidone up.

## 2023-10-11 RX ORDER — MEMANTINE HYDROCHLORIDE 10 MG/1
10 TABLET ORAL 2 TIMES DAILY
Qty: 180 TABLET | Refills: 0 | Status: SHIPPED | OUTPATIENT
Start: 2023-10-11

## 2023-10-11 NOTE — TELEPHONE ENCOUNTER
Donna Torres is calling to request a refill on the following medication(s):    Medication Request:  Requested Prescriptions     Pending Prescriptions Disp Refills    memantine (NAMENDA) 10 MG tablet [Pharmacy Med Name: Memantine HCl 10 MG Oral Tablet] 180 tablet 0     Sig: TAKE 1 TABLET BY MOUTH ONCE DAILY FOR 10 DAYS, THEN 1 TABLET BY MOUTH TWICE A DAY THEREAFTER       Last Visit Date (If Applicable):  2/35/6867    Next Visit Date:    Visit date not found        Last refill 7/6/23. prescription pending.

## 2023-10-12 RX ORDER — MEMANTINE HYDROCHLORIDE 10 MG/1
TABLET ORAL
Qty: 180 TABLET | Refills: 0 | OUTPATIENT
Start: 2023-10-12